# Patient Record
Sex: FEMALE | Race: BLACK OR AFRICAN AMERICAN | NOT HISPANIC OR LATINO | Employment: FULL TIME | URBAN - METROPOLITAN AREA
[De-identification: names, ages, dates, MRNs, and addresses within clinical notes are randomized per-mention and may not be internally consistent; named-entity substitution may affect disease eponyms.]

---

## 2023-02-05 PROBLEM — I25.10 CORONARY ARTERY CALCIFICATION SEEN ON CT SCAN: Status: ACTIVE | Noted: 2023-02-05

## 2023-02-05 PROBLEM — E78.2 MIXED HYPERLIPIDEMIA: Status: ACTIVE | Noted: 2023-02-05

## 2023-02-05 PROBLEM — H25.12 AGE-RELATED NUCLEAR CATARACT OF LEFT EYE: Status: ACTIVE | Noted: 2023-02-05

## 2023-02-05 PROBLEM — H43.811 POSTERIOR VITREOUS DETACHMENT OF RIGHT EYE: Status: ACTIVE | Noted: 2023-02-05

## 2023-02-05 PROBLEM — Z96.1 PSEUDOPHAKIA: Status: ACTIVE | Noted: 2023-02-05

## 2023-02-05 PROBLEM — R10.2 FEMALE PELVIC PAIN: Status: ACTIVE | Noted: 2023-02-05

## 2023-02-05 PROBLEM — M79.674 PAIN AROUND TOENAIL, RIGHT FOOT: Status: ACTIVE | Noted: 2023-02-05

## 2023-02-05 PROBLEM — N39.0 CHRONIC UTI: Status: ACTIVE | Noted: 2023-02-05

## 2023-02-05 PROBLEM — R00.1 SINUS BRADYCARDIA: Status: ACTIVE | Noted: 2023-02-05

## 2023-02-05 PROBLEM — R91.1 PULMONARY NODULE: Status: ACTIVE | Noted: 2023-02-05

## 2023-02-05 PROBLEM — R93.89 THICKENED ENDOMETRIUM: Status: ACTIVE | Noted: 2023-02-05

## 2023-02-05 PROBLEM — L84 PRE-ULCERATIVE CALLUSES: Status: ACTIVE | Noted: 2023-02-05

## 2023-02-05 PROBLEM — N93.9 ABNORMAL UTERINE BLEEDING (AUB): Status: ACTIVE | Noted: 2023-02-05

## 2023-02-05 PROBLEM — H53.9 CHANGES IN VISION: Status: ACTIVE | Noted: 2023-02-05

## 2023-02-05 PROBLEM — H25.13 CATARACT, NUCLEAR SCLEROTIC, BOTH EYES: Status: ACTIVE | Noted: 2023-02-05

## 2023-02-05 PROBLEM — E55.9 VITAMIN D DEFICIENCY: Status: ACTIVE | Noted: 2023-02-05

## 2023-02-05 PROBLEM — H40.051 OCULAR HYPERTENSION OF RIGHT EYE: Status: ACTIVE | Noted: 2023-02-05

## 2023-02-05 PROBLEM — R06.02 SOB (SHORTNESS OF BREATH): Status: ACTIVE | Noted: 2023-02-05

## 2023-02-05 PROBLEM — F32.1 MODERATE SINGLE CURRENT EPISODE OF MAJOR DEPRESSIVE DISORDER (MULTI): Status: ACTIVE | Noted: 2023-02-05

## 2023-02-05 PROBLEM — L60.2 ONYCHOGRYPHOSIS: Status: ACTIVE | Noted: 2023-02-05

## 2023-02-05 PROBLEM — R39.89 SENSATION OF PRESSURE IN BLADDER AREA: Status: ACTIVE | Noted: 2023-02-05

## 2023-02-05 PROBLEM — N81.89 PELVIC FLOOR WEAKNESS: Status: ACTIVE | Noted: 2023-02-05

## 2023-02-05 PROBLEM — R39.9 UTI SYMPTOMS: Status: ACTIVE | Noted: 2023-02-05

## 2023-02-05 PROBLEM — H40.003 GLAUCOMA SUSPECT OF BOTH EYES: Status: ACTIVE | Noted: 2023-02-05

## 2023-02-05 RX ORDER — DORZOLAMIDE HYDROCHLORIDE AND TIMOLOL MALEATE 20; 5 MG/ML; MG/ML
1 SOLUTION/ DROPS OPHTHALMIC 2 TIMES DAILY
COMMUNITY
Start: 2021-05-18 | End: 2023-03-09 | Stop reason: WASHOUT

## 2023-03-09 ENCOUNTER — OFFICE VISIT (OUTPATIENT)
Dept: PRIMARY CARE | Facility: CLINIC | Age: 68
End: 2023-03-09
Payer: MEDICARE

## 2023-03-09 VITALS
BODY MASS INDEX: 35.47 KG/M2 | HEIGHT: 67 IN | DIASTOLIC BLOOD PRESSURE: 61 MMHG | OXYGEN SATURATION: 93 % | HEART RATE: 49 BPM | SYSTOLIC BLOOD PRESSURE: 134 MMHG | WEIGHT: 226 LBS

## 2023-03-09 DIAGNOSIS — N95.8 OTHER SPECIFIED MENOPAUSAL AND PERIMENOPAUSAL DISORDERS: ICD-10-CM

## 2023-03-09 DIAGNOSIS — Z12.31 VISIT FOR SCREENING MAMMOGRAM: ICD-10-CM

## 2023-03-09 DIAGNOSIS — E78.2 MIXED HYPERLIPIDEMIA: Primary | ICD-10-CM

## 2023-03-09 DIAGNOSIS — Z13.6 SCREENING FOR CARDIOVASCULAR CONDITION: ICD-10-CM

## 2023-03-09 DIAGNOSIS — Z00.00 ENCOUNTER FOR INITIAL ANNUAL WELLNESS VISIT (AWV) IN MEDICARE PATIENT: ICD-10-CM

## 2023-03-09 DIAGNOSIS — R93.1 AGATSTON CAC SCORE 100-199: ICD-10-CM

## 2023-03-09 DIAGNOSIS — E55.9 VITAMIN D DEFICIENCY: ICD-10-CM

## 2023-03-09 DIAGNOSIS — Z00.00 ROUTINE GENERAL MEDICAL EXAMINATION AT HEALTH CARE FACILITY: ICD-10-CM

## 2023-03-09 PROBLEM — R06.02 SOB (SHORTNESS OF BREATH): Status: RESOLVED | Noted: 2023-02-05 | Resolved: 2023-03-09

## 2023-03-09 PROBLEM — R10.2 FEMALE PELVIC PAIN: Status: RESOLVED | Noted: 2023-02-05 | Resolved: 2023-03-09

## 2023-03-09 PROBLEM — R39.9 UTI SYMPTOMS: Status: RESOLVED | Noted: 2023-02-05 | Resolved: 2023-03-09

## 2023-03-09 PROCEDURE — 1036F TOBACCO NON-USER: CPT | Performed by: FAMILY MEDICINE

## 2023-03-09 PROCEDURE — 99213 OFFICE O/P EST LOW 20 MIN: CPT | Performed by: FAMILY MEDICINE

## 2023-03-09 PROCEDURE — 1159F MED LIST DOCD IN RCRD: CPT | Performed by: FAMILY MEDICINE

## 2023-03-09 PROCEDURE — G0403 EKG FOR INITIAL PREVENT EXAM: HCPCS | Performed by: FAMILY MEDICINE

## 2023-03-09 PROCEDURE — 1170F FXNL STATUS ASSESSED: CPT | Performed by: FAMILY MEDICINE

## 2023-03-09 PROCEDURE — G0402 INITIAL PREVENTIVE EXAM: HCPCS | Performed by: FAMILY MEDICINE

## 2023-03-09 PROCEDURE — 1160F RVW MEDS BY RX/DR IN RCRD: CPT | Performed by: FAMILY MEDICINE

## 2023-03-09 PROCEDURE — 1157F ADVNC CARE PLAN IN RCRD: CPT | Performed by: FAMILY MEDICINE

## 2023-03-09 RX ORDER — ATORVASTATIN CALCIUM 20 MG/1
20 TABLET, FILM COATED ORAL DAILY
Qty: 30 TABLET | Refills: 5 | Status: SHIPPED | OUTPATIENT
Start: 2023-03-09 | End: 2023-06-12 | Stop reason: SDUPTHER

## 2023-03-09 ASSESSMENT — ACTIVITIES OF DAILY LIVING (ADL)
TOILETING: INDEPENDENT
WALKS IN HOME: INDEPENDENT
HEARING - RIGHT EAR: FUNCTIONAL
GROOMING: INDEPENDENT
FEEDING YOURSELF: INDEPENDENT
ADEQUATE_TO_COMPLETE_ADL: YES
DRESSING YOURSELF: INDEPENDENT
HEARING - LEFT EAR: FUNCTIONAL
PATIENT'S MEMORY ADEQUATE TO SAFELY COMPLETE DAILY ACTIVITIES?: YES
JUDGMENT_ADEQUATE_SAFELY_COMPLETE_DAILY_ACTIVITIES: YES
BATHING: INDEPENDENT

## 2023-03-09 ASSESSMENT — ENCOUNTER SYMPTOMS
LOSS OF SENSATION IN FEET: 0
DEPRESSION: 0
OCCASIONAL FEELINGS OF UNSTEADINESS: 0

## 2023-03-09 ASSESSMENT — PATIENT HEALTH QUESTIONNAIRE - PHQ9
SUM OF ALL RESPONSES TO PHQ9 QUESTIONS 1 AND 2: 0
1. LITTLE INTEREST OR PLEASURE IN DOING THINGS: NOT AT ALL
1. LITTLE INTEREST OR PLEASURE IN DOING THINGS: NOT AT ALL
SUM OF ALL RESPONSES TO PHQ9 QUESTIONS 1 AND 2: 0
2. FEELING DOWN, DEPRESSED OR HOPELESS: NOT AT ALL
2. FEELING DOWN, DEPRESSED OR HOPELESS: NOT AT ALL

## 2023-03-09 ASSESSMENT — COLUMBIA-SUICIDE SEVERITY RATING SCALE - C-SSRS
1. IN THE PAST MONTH, HAVE YOU WISHED YOU WERE DEAD OR WISHED YOU COULD GO TO SLEEP AND NOT WAKE UP?: NO
2. HAVE YOU ACTUALLY HAD ANY THOUGHTS OF KILLING YOURSELF?: NO
6. HAVE YOU EVER DONE ANYTHING, STARTED TO DO ANYTHING, OR PREPARED TO DO ANYTHING TO END YOUR LIFE?: NO

## 2023-03-09 NOTE — PROGRESS NOTES
"Subjective   Patient ID: Zahira Morris is a 67 y.o. female who presents for Medicare Annual Wellness Visit Subsequent.    Recent Hospitalizations: No   Have you felt Anxious depressed or sad in the last 4 weeks: No   Do you have any pain right now: No   Do you have someone to help you when you need help: Some  Can you get to places out of walking distance without help: Yes  Can you do your own grocery shopping without help: Yes  Can you prepare meals without help: Yes  Can you do your housework without help: Yes  Can you manage your money without help: Yes    How do you rate your overall health: Excellent     Any trouble driving: No     How much Alcohol do you drink on an average in a week: None     Exercise: 5 times a week  Diet: Healthy  Fluid intake: Yes  Caffeine: No     Hearing concerns: No  Cognitive impairment: No   Incontinence: No     Home safety Concerns: No         HPI   The patient states that she is not on any prescribed medications at this time. She has no further concerns at this time.    Review of Systems  Constitutional: No fever or chills, No Night Sweats  Eyes: No Blurry Vision or Eye sight problems  ENT: No Nasal Discharge, Hoarseness, sore throat  Cardiovascular: no chest pain, no palpitations and no syncope.   Respiratory: no cough, no shortness of breath during exertion and no shortness of breath at rest.   Gastrointestinal: no abdominal pain, no nausea and no vomiting.   : No vaginal discharge, burning with urination, no blood in urine or stools  Skin: No Skin rashes or Lesions  Neuro: No Headache, no dizziness or Numbness or tingling  Psych: No Anxiety, depression or sleeping problems  Heme: No Easy bleeding or brusing.     Objective   /61 (BP Location: Left arm, Patient Position: Sitting, BP Cuff Size: Adult)   Pulse (!) 49   Ht 1.689 m (5' 6.5\")   Wt 103 kg (226 lb)   SpO2 93%   BMI 35.93 kg/m²     Physical Exam  Patient declined Chaperone  Constitutional: Alert and in no " acute distress. Well developed, well nourished.   Head and Face: Head and face: Normal.    Eyes: Normal external exam. Pupils were equal in size, round, reactive to light (PERRL) with normal accommodation and extraocular movements intact (EOMI).   Ears, Nose, Mouth, and Throat: External inspection of ears and nose: Normal.  Hearing: Normal.  Nasal mucosa, septum, and turbinates: Normal.  Lips, teeth, and gums: Normal.  Oropharynx: Normal.   Neck: No neck mass was observed. Supple. Thyroid not enlarged and there were no palpable thyroid nodules.   Cardiovascular: Heart rate and rhythm were normal, normal S1 and S2. Pedal pulses: Normal. No peripheral edema.   Pulmonary: No respiratory distress. Clear bilateral breath sounds.   Breast: Normal Appearance, No Masses or lumps palpated  Abdomen: Soft nontender; no abdominal mass palpated. Normal bowel sounds. No organomegaly.   Musculoskeletal: No joint swelling seen, normal movements of all extremities. Range of motion: Normal.  Muscle strength/tone: Normal.    Skin: Normal skin color and pigmentation, normal skin turgor, and no rash.   Neurologic: Deep tendon reflexes were 2+ and symmetric.   Psychiatric: Judgment and insight: Intact. Mood and affect: Normal.  Lymphatic: No cervical lymphadenopathy. Palpation of lymph nodes in axillae: Normal.  Palpation of lymph nodes in groin: Normal.    Assessment/Plan   Diagnoses and all orders for this visit:  Mixed hyperlipidemia  -     atorvastatin (Lipitor) 20 mg tablet; Take 1 tablet (20 mg) by mouth once daily.  -     Lipid Panel; Future  Agatston CAC score 100-199  -     Lipid Panel; Future  -     Comprehensive Metabolic Panel; Future  -     Follow Up In [facility] - PCP; Future  Vitamin D deficiency  Encounter for initial annual wellness visit (AWV) in Medicare patient  Other specified menopausal and perimenopausal disorders  -     XR DEXA bone density; Future  Visit for screening mammogram  -     BI mammo  bilateral screening tomosynthesis; Future  Screening for cardiovascular condition  -     ECG 12 lead  Routine general medical examination at health care facility        Dear Zahira Morris     It was my pleasure to take care of you today in the office. Below are the things we discussed today:    1. 1. Immunizations: Yearly Flu shot is recommended. Patient declined         a: COVID: Up-to-Date         b: Tetanus: Patient declined         c: Shingrix: Patient declined         d: Pneumovax: Patient declined         e: Prevnar: Patient declined    2. Blood Work: Reviewed  3. Seen your dentist twice a year  4. Yearly Eye exam is recommended    5. BMI: Obese  6: Diet recommendations:   Eat Clean, Try to have as many home cooked meals as possible  Avoid processed foods which contain excess calories, sugar, and sodium.    7. Exercise recommendations:   150 minutes a week to maintain your weight     If you have to loose weight, you need a better diet and exercise plan.     8. Supplements recommended:  a - Calcium 600 mg up to twice a day to get a total of 1200 mg. Each 8 oz of milk or yogurt or 1 oz of cheese, 1 Banana, 1 serving of green Leafy vegetable has about 300 mg of Calcium, so you may subtract that amount. Calcium citrate is the only acceptable supplement to take if you take an acid suppressing medication like Prilosec; otherwise Calcium carbonate is acceptable too (It can cause Constipation).   b - Vitamin D - 2000 IU daily     9. Please get your Living will / Advance directive completed if you do not have one already. Please make sure our office has a copy of the latest one.     10. Colonoscopy: Uptodate, repeat in December 2030  11. Mammogram: Uptodate, ordered for Nov 2023  12. PAP: Not indicated   13. DEXA: Ordered  14: Skin Check: Please see Dermatology once a year for a Skin Check.     15. Hyperlipidemia: Start 20 mg atorvastatin. CT Cardiac score showed a score of 142.     16. Low Vitamin D: Advised  patient to take 2000 Ius Vitamin D daily.    Follow up in  3 months.    Follow up in one year for a Physical. Please call the office before your Physical to see if you need blood work completed prior to your physical.     Please call me if any questions arise from now until your next visit. I will call you after I am done seeing patients. A Doctor is always available by phone when the office is closed. Please feel free to call for help with any problem that you feel shouldn't wait until the office re-opens.     Scribe Attestation  By signing my name below, IAlea Scribe   attest that this documentation has been prepared under the direction and in the presence of Duyen Collins MD.

## 2023-03-09 NOTE — PROGRESS NOTES
"Subjective   Reason for Visit: Zahira Morris is an 67 y.o. female here for a Medicare Wellness visit.     Past Medical, Surgical, and Family History reviewed and updated in chart.    Reviewed all medications by prescribing practitioner or clinical pharmacist (such as prescriptions, OTCs, herbal therapies and supplements) and documented in the medical record.    HPI                        Patient Care Team:  Duyen Collins MD as PCP - General  Duyen Collins MD as PCP - [facility] PCP     Review of Systems    Objective   Vitals:  /61 (BP Location: Left arm, Patient Position: Sitting, BP Cuff Size: Adult)   Pulse (!) 49   Ht 1.689 m (5' 6.5\")   Wt 103 kg (226 lb)   SpO2 93%   BMI 35.93 kg/m²       Physical Exam    Assessment/Plan   Problem List Items Addressed This Visit        Circulatory    Agatston CAC score 100-199    Relevant Orders    Lipid Panel    Comprehensive Metabolic Panel    Follow Up In [facility] - PCP       Endocrine/Metabolic    Vitamin D deficiency       Other    Mixed hyperlipidemia - Primary    Relevant Medications    atorvastatin (Lipitor) 20 mg tablet    Other Relevant Orders    Lipid Panel   Other Visit Diagnoses     Encounter for initial annual wellness visit (AWV) in Medicare patient        Other specified menopausal and perimenopausal disorders        Relevant Orders    XR DEXA bone density    Visit for screening mammogram        Relevant Orders    BI mammo bilateral screening tomosynthesis    Screening for cardiovascular condition        Relevant Orders    ECG 12 lead    Routine general medical examination at health care facility                 "

## 2023-06-12 ENCOUNTER — OFFICE VISIT (OUTPATIENT)
Dept: PRIMARY CARE | Facility: CLINIC | Age: 68
End: 2023-06-12
Payer: MEDICARE

## 2023-06-12 VITALS
SYSTOLIC BLOOD PRESSURE: 120 MMHG | HEIGHT: 65 IN | OXYGEN SATURATION: 92 % | BODY MASS INDEX: 37.15 KG/M2 | HEART RATE: 45 BPM | DIASTOLIC BLOOD PRESSURE: 73 MMHG | WEIGHT: 223 LBS

## 2023-06-12 DIAGNOSIS — E78.2 MIXED HYPERLIPIDEMIA: Primary | ICD-10-CM

## 2023-06-12 DIAGNOSIS — F32.1 MODERATE SINGLE CURRENT EPISODE OF MAJOR DEPRESSIVE DISORDER (MULTI): ICD-10-CM

## 2023-06-12 DIAGNOSIS — E66.01 CLASS 2 SEVERE OBESITY DUE TO EXCESS CALORIES WITH SERIOUS COMORBIDITY AND BODY MASS INDEX (BMI) OF 37.0 TO 37.9 IN ADULT (MULTI): ICD-10-CM

## 2023-06-12 DIAGNOSIS — E78.2 MIXED HYPERLIPIDEMIA: ICD-10-CM

## 2023-06-12 DIAGNOSIS — R93.1 AGATSTON CAC SCORE 100-199: ICD-10-CM

## 2023-06-12 DIAGNOSIS — R35.0 URINARY FREQUENCY: ICD-10-CM

## 2023-06-12 PROBLEM — E66.812 CLASS 2 SEVERE OBESITY DUE TO EXCESS CALORIES WITH SERIOUS COMORBIDITY AND BODY MASS INDEX (BMI) OF 37.0 TO 37.9 IN ADULT: Status: ACTIVE | Noted: 2023-06-12

## 2023-06-12 LAB
ALANINE AMINOTRANSFERASE (SGPT) (U/L) IN SER/PLAS: 20 U/L (ref 7–45)
ALBUMIN (G/DL) IN SER/PLAS: 4.3 G/DL (ref 3.4–5)
ALKALINE PHOSPHATASE (U/L) IN SER/PLAS: 94 U/L (ref 33–136)
ANION GAP IN SER/PLAS: 11 MMOL/L (ref 10–20)
ASPARTATE AMINOTRANSFERASE (SGOT) (U/L) IN SER/PLAS: 26 U/L (ref 9–39)
BILIRUBIN TOTAL (MG/DL) IN SER/PLAS: 0.5 MG/DL (ref 0–1.2)
CALCIUM (MG/DL) IN SER/PLAS: 10.1 MG/DL (ref 8.6–10.6)
CARBON DIOXIDE, TOTAL (MMOL/L) IN SER/PLAS: 30 MMOL/L (ref 21–32)
CHLORIDE (MMOL/L) IN SER/PLAS: 104 MMOL/L (ref 98–107)
CHOLESTEROL (MG/DL) IN SER/PLAS: 178 MG/DL (ref 0–199)
CHOLESTEROL IN HDL (MG/DL) IN SER/PLAS: 45.5 MG/DL
CHOLESTEROL/HDL RATIO: 3.9
CREATININE (MG/DL) IN SER/PLAS: 0.98 MG/DL (ref 0.5–1.05)
GFR FEMALE: 63 ML/MIN/1.73M2
GLUCOSE (MG/DL) IN SER/PLAS: 83 MG/DL (ref 74–99)
LDL: 107 MG/DL (ref 0–99)
POTASSIUM (MMOL/L) IN SER/PLAS: 4.1 MMOL/L (ref 3.5–5.3)
PROTEIN TOTAL: 7.3 G/DL (ref 6.4–8.2)
SODIUM (MMOL/L) IN SER/PLAS: 141 MMOL/L (ref 136–145)
TRIGLYCERIDE (MG/DL) IN SER/PLAS: 126 MG/DL (ref 0–149)
UREA NITROGEN (MG/DL) IN SER/PLAS: 11 MG/DL (ref 6–23)
VLDL: 25 MG/DL (ref 0–40)

## 2023-06-12 PROCEDURE — 1159F MED LIST DOCD IN RCRD: CPT | Performed by: FAMILY MEDICINE

## 2023-06-12 PROCEDURE — 1160F RVW MEDS BY RX/DR IN RCRD: CPT | Performed by: FAMILY MEDICINE

## 2023-06-12 PROCEDURE — 3008F BODY MASS INDEX DOCD: CPT | Performed by: FAMILY MEDICINE

## 2023-06-12 PROCEDURE — 80061 LIPID PANEL: CPT

## 2023-06-12 PROCEDURE — 1157F ADVNC CARE PLAN IN RCRD: CPT | Performed by: FAMILY MEDICINE

## 2023-06-12 PROCEDURE — 1036F TOBACCO NON-USER: CPT | Performed by: FAMILY MEDICINE

## 2023-06-12 PROCEDURE — 80053 COMPREHEN METABOLIC PANEL: CPT

## 2023-06-12 PROCEDURE — 99214 OFFICE O/P EST MOD 30 MIN: CPT | Performed by: FAMILY MEDICINE

## 2023-06-12 RX ORDER — SOLIFENACIN SUCCINATE 5 MG/1
5 TABLET, FILM COATED ORAL DAILY
Qty: 30 TABLET | Refills: 1 | Status: SHIPPED | OUTPATIENT
Start: 2023-06-12 | End: 2023-08-11

## 2023-06-12 RX ORDER — ATORVASTATIN CALCIUM 20 MG/1
20 TABLET, FILM COATED ORAL DAILY
Qty: 90 TABLET | Refills: 1 | Status: SHIPPED | OUTPATIENT
Start: 2023-06-12 | End: 2024-04-04 | Stop reason: SDUPTHER

## 2023-06-12 ASSESSMENT — ENCOUNTER SYMPTOMS
LOSS OF SENSATION IN FEET: 0
DEPRESSION: 0
OCCASIONAL FEELINGS OF UNSTEADINESS: 0

## 2023-06-12 ASSESSMENT — PATIENT HEALTH QUESTIONNAIRE - PHQ9
2. FEELING DOWN, DEPRESSED OR HOPELESS: NOT AT ALL
1. LITTLE INTEREST OR PLEASURE IN DOING THINGS: NOT AT ALL
SUM OF ALL RESPONSES TO PHQ9 QUESTIONS 1 AND 2: 0

## 2023-06-12 NOTE — PROGRESS NOTES
"Subjective   Patient ID: Zahira Morris is a 67 y.o. female who presents for Follow-up.    HPI   The patient states that she did not get blood work done for her cholesterol. She is taking atorvastatin for her hyperlipidemia at this time and will get the blood work done today. She feels like her depression is stable without any medications and complains of urinary frequency. The patient noticed a mass in her right axillar however, since then she has not noticed it anymore.    Review of Systems  Constitutional: No fever or chills  Cardiovascular: no chest pain, no palpitations and no syncope.   Respiratory: no cough, no shortness of breath during exertion and no shortness of breath at rest.   Gastrointestinal: no abdominal pain, no nausea and no vomiting.  : + urinary frequency.  Neuro: No Headache, no dizziness    Objective   /73   Pulse (!) 45   Ht 1.651 m (5' 5\")   Wt 101 kg (223 lb)   SpO2 92%   BMI 37.11 kg/m²     Physical Exam  Constitutional: Alert and in no acute distress. Well developed, well nourished  Head and Face: Head and face: Normal.    Cardiovascular: Heart rate and rhythm were normal, normal S1 and S2. No peripheral edema.   Pulmonary: No respiratory distress. Clear bilateral breath sounds.  Musculoskeletal: Gait and station: Normal. Muscle strength/tone: Normal.   Skin: Normal skin color and pigmentation, normal skin turgor, and no rash.    Psychiatric: Judgment and insight: Intact. Mood and affect: Normal.    Lab Results   Component Value Date    WBC 6.8 01/31/2023    HGB 14.0 01/31/2023    HCT 45.4 01/31/2023     01/31/2023    CHOL 237 (H) 01/31/2023    TRIG 128 01/31/2023    HDL 49.3 01/31/2023    ALT 22 01/31/2023    AST 44 (H) 01/31/2023     01/31/2023    K 4.4 01/31/2023     01/31/2023    CREATININE 1.02 01/31/2023    BUN 14 01/31/2023    CO2 29 01/31/2023    TSH 1.19 01/31/2023    INR 1.5 (H) 01/18/2021       ECG 12 lead  Sinus bradycardia with t wave non " specific abnormality      Assessment/Plan   Diagnoses and all orders for this visit:  Mixed hyperlipidemia  -     atorvastatin (Lipitor) 20 mg tablet; Take 1 tablet (20 mg) by mouth once daily.  Agatston CAC score 100-199  -     Follow Up In [facility] - PCP  Moderate single current episode of major depressive disorder (CMS/HCC)  Class 2 severe obesity due to excess calories with serious comorbidity and body mass index (BMI) of 37.0 to 37.9 in adult (CMS/HCC)  Urinary frequency  -     solifenacin (VESIcare) 5 mg tablet; Take 1 tablet (5 mg) by mouth once daily. Swallow tablet whole; do not crush, chew, or split.        Dear Zahira Morris     It was my pleasure to take care of you today in the office. Below are the things we discussed today:    1. Hyperlipidemia: Continue atorvastatin.    2. Urinary frequency: Start Vesicare 5 mg. If you do not any experience any side effects we can increase the dose. Advised the patient to consume less water after 5 pm.    3. Right axillar mass: none noted today, Advised the patient that if it reoccurs please let me know.    4. Blood work: The patient will get blood work done today.    Follow up in 1 month.    Your yearly Physical is due in: March 2024  When you call the office for your yearly Physical, please ask them to inform me to order your blood work, so that you can get the fasting blood work before your appointment and we can discuss the results at your physical.      Please call me if any questions arise from now until your next visit. I will call you after I am done seeing patients. A Doctor is always available by phone when the office is closed. Please feel free to call for help with any problem that you feel shouldn't wait until the office re-opens.     Scribe Attestation  By signing my name below, IAlea Scribe   attest that this documentation has been prepared under the direction and in the presence of Duyen Collins MD.

## 2024-02-26 ENCOUNTER — APPOINTMENT (OUTPATIENT)
Dept: RADIOLOGY | Facility: HOSPITAL | Age: 69
End: 2024-02-26
Payer: COMMERCIAL

## 2024-02-26 ENCOUNTER — HOSPITAL ENCOUNTER (EMERGENCY)
Facility: HOSPITAL | Age: 69
Discharge: HOME | End: 2024-02-26
Payer: COMMERCIAL

## 2024-02-26 VITALS
RESPIRATION RATE: 16 BRPM | HEIGHT: 65 IN | TEMPERATURE: 98.3 F | DIASTOLIC BLOOD PRESSURE: 68 MMHG | OXYGEN SATURATION: 99 % | WEIGHT: 225 LBS | HEART RATE: 55 BPM | BODY MASS INDEX: 37.49 KG/M2 | SYSTOLIC BLOOD PRESSURE: 148 MMHG

## 2024-02-26 DIAGNOSIS — S80.00XA CONTUSION OF KNEE, UNSPECIFIED LATERALITY, INITIAL ENCOUNTER: Primary | ICD-10-CM

## 2024-02-26 PROCEDURE — 2500000001 HC RX 250 WO HCPCS SELF ADMINISTERED DRUGS (ALT 637 FOR MEDICARE OP): Performed by: PHYSICIAN ASSISTANT

## 2024-02-26 PROCEDURE — 73564 X-RAY EXAM KNEE 4 OR MORE: CPT | Mod: 50

## 2024-02-26 PROCEDURE — 73564 X-RAY EXAM KNEE 4 OR MORE: CPT | Mod: BILATERAL PROCEDURE | Performed by: RADIOLOGY

## 2024-02-26 PROCEDURE — 99283 EMERGENCY DEPT VISIT LOW MDM: CPT

## 2024-02-26 RX ORDER — ACETAMINOPHEN 325 MG/1
975 TABLET ORAL ONCE
Status: COMPLETED | OUTPATIENT
Start: 2024-02-26 | End: 2024-02-26

## 2024-02-26 RX ORDER — NAPROXEN 500 MG/1
500 TABLET ORAL ONCE
Status: COMPLETED | OUTPATIENT
Start: 2024-02-26 | End: 2024-02-26

## 2024-02-26 RX ORDER — TRAMADOL HYDROCHLORIDE 50 MG/1
50 TABLET ORAL EVERY 6 HOURS PRN
Qty: 12 TABLET | Refills: 0 | Status: SHIPPED | OUTPATIENT
Start: 2024-02-26 | End: 2024-02-29

## 2024-02-26 RX ADMIN — NAPROXEN 500 MG: 500 TABLET ORAL at 13:30

## 2024-02-26 RX ADMIN — ACETAMINOPHEN 975 MG: 325 TABLET ORAL at 13:30

## 2024-02-26 ASSESSMENT — COLUMBIA-SUICIDE SEVERITY RATING SCALE - C-SSRS
2. HAVE YOU ACTUALLY HAD ANY THOUGHTS OF KILLING YOURSELF?: NO
1. IN THE PAST MONTH, HAVE YOU WISHED YOU WERE DEAD OR WISHED YOU COULD GO TO SLEEP AND NOT WAKE UP?: NO
6. HAVE YOU EVER DONE ANYTHING, STARTED TO DO ANYTHING, OR PREPARED TO DO ANYTHING TO END YOUR LIFE?: NO

## 2024-02-26 ASSESSMENT — PAIN - FUNCTIONAL ASSESSMENT: PAIN_FUNCTIONAL_ASSESSMENT: 0-10

## 2024-02-26 ASSESSMENT — PAIN DESCRIPTION - LOCATION: LOCATION: KNEE

## 2024-02-26 ASSESSMENT — PAIN SCALES - GENERAL: PAINLEVEL_OUTOF10: 9

## 2024-02-26 NOTE — ED PROVIDER NOTES
HPI   Chief Complaint   Patient presents with    Fall       68-year-old female presents with bilateral knee pain.  Mechanical fall on ice 1 week ago.  She has been ambulating but pain and swelling have progressed.  Has had some relief with over-the-counter NSAIDs and Tylenol.  No other complaints of pain or injury.      History provided by:  Patient   used: No                        [place] Coma Scale Score: 15                     Patient History   Past Medical History:   Diagnosis Date    COVID-19     COVID-19 virus infection    Left lower quadrant abdominal tenderness 10/06/2020    LLQ abdominal tenderness    Lower abdominal pain, unspecified 07/16/2021    Lower abdominal pain    Other chest pain 10/01/2020    Atypical chest pain    Pain, unspecified 03/09/2020    Pain    Pelvic and perineal pain 02/03/2020    Female pelvic pain    Personal history of other benign neoplasm 10/12/2017    History of other benign neoplasm    Personal history of other infectious and parasitic diseases 10/01/2020    History of herpes zoster    Personal history of other specified conditions 10/01/2020    History of abdominal pain    Unspecified abdominal pain 03/04/2021    Right sided abdominal pain     Past Surgical History:   Procedure Laterality Date    CT GUIDED PERCUTANEOUS PERITONEAL OR RETROPERITONEAL FLUID COLLECTION DRAINAGE  1/18/2021    CT GUIDED PERCUTANEOUS PERITONEAL OR RETROPERITONEAL FLUID COLLECTION DRAINAGE 1/18/2021 [facility]    OTHER SURGICAL HISTORY  06/04/2021    Cataract surgery    OTHER SURGICAL HISTORY  03/02/2021    Salpingectomy    OTHER SURGICAL HISTORY  03/02/2021    Small bowel resection    OTHER SURGICAL HISTORY  03/02/2021    Hysteroscopy    OTHER SURGICAL HISTORY  10/06/2020    Hernia repair     Family History   Problem Relation Name Age of Onset    Hypertension Mother          Benign    Other (Mesothelioma) Father       Social History     Tobacco Use    Smoking status:  Former     Types: Cigarettes     Quit date:      Years since quittin.1    Smokeless tobacco: Never   Vaping Use    Vaping Use: Never used   Substance Use Topics    Alcohol use: Never    Drug use: Never       Physical Exam   ED Triage Vitals   Temperature Heart Rate Respirations BP   24 1242 24 1242 24 1242 24 1246   36.8 °C (98.3 °F) 55 16 148/68      Pulse Ox Temp src Heart Rate Source Patient Position   24 1242 -- -- --   99 %         BP Location FiO2 (%)     -- --             Physical Exam  Vitals and nursing note reviewed.   Constitutional:       General: She is not in acute distress.     Appearance: Normal appearance. She is obese. She is not ill-appearing, toxic-appearing or diaphoretic.   HENT:      Head: Normocephalic and atraumatic.   Cardiovascular:      Rate and Rhythm: Normal rate.   Pulmonary:      Effort: Pulmonary effort is normal.   Musculoskeletal:         General: Swelling and tenderness present. No deformity.      Cervical back: Normal range of motion.      Comments: Right knee swollen TTP anteriorly over patellar surface.  Extensor mechanism intact.  Joint stable.  No deformity.  Mild skin ecchymoses.  No open wounds.  No pain or tenderness above or below the knee.  Left knee with less swelling.  TTP anteriorly over patella surface.  Extensor mechanism intact.  Joint stable.  No deformity.  Skin intact.  No pain or tenderness above or below the knee.   Skin:     General: Skin is warm and dry.      Findings: Bruising present.   Neurological:      General: No focal deficit present.      Mental Status: She is alert and oriented to person, place, and time.   Psychiatric:         Mood and Affect: Mood normal.         Behavior: Behavior normal.         Thought Content: Thought content normal.         ED Course & MDM   ED Course as of 24 1504   Mon 2024   1453 No fracture or dislocation of the knees.  No effusion.  Degenerative changes present. [GA]       ED Course User Index  [GA] Petros Salgado PA-C         Diagnoses as of 02/26/24 1504   Contusion of knee, unspecified laterality, initial encounter     XR knee 4+ views bilateral   Final Result   Patellar enthesopathy. Mild narrowing of the medial compartments.   Otherwise, no acute fracture.   Signed by Torrey Rizzo MD         Medical Decision Making  Bilateral knee injury: Fracture dislocation contusion sprain strain internal derangement of joint  P.o. Tylenol Naprosyn  X-rays no fracture dislocation.  Degenerative changes.  No effusion.    Patient has been ambulatory for a week since the injury.  No fracture or dislocation.  Has degenerative arthritis with addition of recent contusion from fall.  Normal neurovascular exam.  No functional deficit.  Continue Tylenol NSAIDs.  Stable for discharge outpatient management.  Follow-up with orthopedics or PCP.  Prescribed tramadol 12 tablets if needed.  May also use Tylenol NSAIDs.  Advise cane to assist with ambulation.    Amount and/or Complexity of Data Reviewed  Radiology: ordered. Decision-making details documented in ED Course.     Details: As above    Risk  OTC drugs.  Prescription drug management.        Procedure  Procedures     Petros Salgado PA-C  02/26/24 150

## 2024-02-26 NOTE — ED TRIAGE NOTES
PT PRESENTS TO THE ED FOR A FALL THAT OCCURRED ABOUT A WEEK AGO. PT ENDORSES SHE WAS OUTSIDE AND LANDED ON BOTH HER KNEES. PT STATES THAT BOTH HER KNEES STILL HURT SINCE. PT STATES THAT SHE HAS TAKEN TYLENOL FOR IBUPROFEN BUT NO RELIEF. PT ENDORSES THAT BOTH KNEES HURT. PT DENIES LOC. PT DENIES HITTING HER HEAD. PT DENIES NECK, BACK, OR HEAD PAIN. PT DENIES CHEST PAIN OR SOB. PT STATES THAT SHE CAN WALK BU THERE KNEES JUST HURT.

## 2024-03-07 ENCOUNTER — OFFICE VISIT (OUTPATIENT)
Dept: ORTHOPEDIC SURGERY | Facility: HOSPITAL | Age: 69
End: 2024-03-07
Payer: COMMERCIAL

## 2024-03-07 VITALS — WEIGHT: 225 LBS | BODY MASS INDEX: 36.16 KG/M2 | HEIGHT: 66 IN

## 2024-03-07 DIAGNOSIS — S80.01XA PATELLAR CONTUSION, RIGHT, INITIAL ENCOUNTER: Primary | ICD-10-CM

## 2024-03-07 DIAGNOSIS — M75.82 ROTATOR CUFF TENDINITIS, LEFT: ICD-10-CM

## 2024-03-07 DIAGNOSIS — S80.02XA PATELLAR CONTUSION, LEFT, INITIAL ENCOUNTER: ICD-10-CM

## 2024-03-07 PROCEDURE — 3008F BODY MASS INDEX DOCD: CPT | Performed by: ORTHOPAEDIC SURGERY

## 2024-03-07 PROCEDURE — 99203 OFFICE O/P NEW LOW 30 MIN: CPT | Performed by: ORTHOPAEDIC SURGERY

## 2024-03-07 PROCEDURE — 1126F AMNT PAIN NOTED NONE PRSNT: CPT | Performed by: ORTHOPAEDIC SURGERY

## 2024-03-07 PROCEDURE — E0143 WALKER FOLDING WHEELED W/O S: HCPCS | Performed by: ORTHOPAEDIC SURGERY

## 2024-03-07 PROCEDURE — 1160F RVW MEDS BY RX/DR IN RCRD: CPT | Performed by: ORTHOPAEDIC SURGERY

## 2024-03-07 PROCEDURE — 1159F MED LIST DOCD IN RCRD: CPT | Performed by: ORTHOPAEDIC SURGERY

## 2024-03-07 PROCEDURE — 99213 OFFICE O/P EST LOW 20 MIN: CPT | Performed by: ORTHOPAEDIC SURGERY

## 2024-03-07 PROCEDURE — 1036F TOBACCO NON-USER: CPT | Performed by: ORTHOPAEDIC SURGERY

## 2024-03-07 PROCEDURE — 1157F ADVNC CARE PLAN IN RCRD: CPT | Performed by: ORTHOPAEDIC SURGERY

## 2024-03-07 ASSESSMENT — PAIN DESCRIPTION - DESCRIPTORS: DESCRIPTORS: THROBBING

## 2024-03-07 ASSESSMENT — PAIN - FUNCTIONAL ASSESSMENT: PAIN_FUNCTIONAL_ASSESSMENT: 0-10

## 2024-03-07 ASSESSMENT — PAIN SCALES - GENERAL: PAINLEVEL_OUTOF10: 7

## 2024-03-07 NOTE — PROGRESS NOTES
68-year-old is seen with bilateral knee pain.  She has been having persistent moderate throbbing pain in both knees since falling on February 24, 2024.  She also has a history of left shoulder pain which was bothering her before this and she has had physical therapy in the past with relief.  She is a retired federal worker.  Right knee has medial and anterior pain and left knee mostly medial.    Pleasant in no acute distress.  Walks an antalgic gait.  Bilateral knee range of motion is 3 to 120 degrees.  The extensor mechanism is intact bilaterally and she can straight leg raise without a lag.  There is tenderness along the medial side of both knees.  No particular lateral tenderness.  The knees are stable to varus and valgus stress Lachman and posterior drawer.    Multiple x-ray views of the right knee and multiple x-ray views of the left knee are personally reviewed and there is no acute bony abnormality.  There are mild degenerative changes.    A discussion about the knee contusions was done.  She will perform physical therapy.  She will use a walker.  She will also perform physical therapy again for her shoulder.  If she has persistent symptoms over the next several weeks MRI could be obtained to assess for an occult medial femoral condyle fracture.  She can use Tylenol as needed.  If

## 2024-03-08 ENCOUNTER — LAB (OUTPATIENT)
Dept: LAB | Facility: LAB | Age: 69
End: 2024-03-08
Payer: COMMERCIAL

## 2024-03-08 DIAGNOSIS — R93.1 AGATSTON CAC SCORE 100-199: ICD-10-CM

## 2024-03-08 DIAGNOSIS — E78.2 MIXED HYPERLIPIDEMIA: ICD-10-CM

## 2024-03-08 LAB
ALBUMIN SERPL BCP-MCNC: 3.8 G/DL (ref 3.4–5)
ALP SERPL-CCNC: 86 U/L (ref 33–136)
ALT SERPL W P-5'-P-CCNC: 14 U/L (ref 7–45)
ANION GAP SERPL CALC-SCNC: 10 MMOL/L (ref 10–20)
AST SERPL W P-5'-P-CCNC: 17 U/L (ref 9–39)
BILIRUB SERPL-MCNC: 0.4 MG/DL (ref 0–1.2)
BUN SERPL-MCNC: 16 MG/DL (ref 6–23)
CALCIUM SERPL-MCNC: 9.1 MG/DL (ref 8.6–10.3)
CHLORIDE SERPL-SCNC: 103 MMOL/L (ref 98–107)
CHOLEST SERPL-MCNC: 235 MG/DL (ref 0–199)
CHOLESTEROL/HDL RATIO: 5.5
CO2 SERPL-SCNC: 30 MMOL/L (ref 21–32)
CREAT SERPL-MCNC: 1.02 MG/DL (ref 0.5–1.05)
EGFRCR SERPLBLD CKD-EPI 2021: 60 ML/MIN/1.73M*2
GLUCOSE SERPL-MCNC: 102 MG/DL (ref 74–99)
HDLC SERPL-MCNC: 42.9 MG/DL
LDLC SERPL CALC-MCNC: 156 MG/DL
NON HDL CHOLESTEROL: 192 MG/DL (ref 0–149)
POTASSIUM SERPL-SCNC: 4.1 MMOL/L (ref 3.5–5.3)
PROT SERPL-MCNC: 6.3 G/DL (ref 6.4–8.2)
SODIUM SERPL-SCNC: 139 MMOL/L (ref 136–145)
TRIGL SERPL-MCNC: 180 MG/DL (ref 0–149)
VLDL: 36 MG/DL (ref 0–40)

## 2024-03-08 PROCEDURE — 80053 COMPREHEN METABOLIC PANEL: CPT

## 2024-03-08 PROCEDURE — 80061 LIPID PANEL: CPT

## 2024-03-08 PROCEDURE — 36415 COLL VENOUS BLD VENIPUNCTURE: CPT

## 2024-04-03 ENCOUNTER — APPOINTMENT (OUTPATIENT)
Dept: PHYSICAL THERAPY | Facility: CLINIC | Age: 69
End: 2024-04-03

## 2024-04-03 PROBLEM — R26.2 DIFFICULTY IN WALKING INVOLVING LOWER LEG JOINT: Status: ACTIVE | Noted: 2024-04-03

## 2024-04-03 PROBLEM — M25.561 BILATERAL KNEE PAIN: Status: ACTIVE | Noted: 2024-04-03

## 2024-04-03 PROBLEM — M25.562 BILATERAL KNEE PAIN: Status: ACTIVE | Noted: 2024-04-03

## 2024-04-03 NOTE — PROGRESS NOTES
"Physical Therapy    Physical Therapy Evaluation and Treatment      Patient Name: Zahira Morris  MRN: [id number]  Today's Date: 4/4/24  Visit 1  Time Calculation  Start Time: 1045  Stop Time: 1135  Time Calculation (min): 50 min  PT Evaluation Time Entry  PT Evaluation (Low) Time Entry: 30    PT Therapeutic Procedures Time Entry  Therapeutic Exercise Time Entry: 20    PT Modalities Time Entry  Hot/Cold Pack Time Entry: 10         Assessment:    Patient presents with clinical signs and symptoms bilat  knee contusion  and patello-femoral dysfunction .  She has pain inhibition making her reluctant to move LEs. She is 6 weeks post injury. These impairments affect ADLs, work, recreational activity, exercise, transfer ability, ambulation, and sleep function that requires skilled PT intervention to resolve and enable patient to return to previous level of function. Factors that may affect progress in PT are chronic pain, obesity, medical co-morbidities, and patient compliance.  Patient response to initial treatment of   heat and exercise was tolerated well. Post treatment Zahira Morris was able to step off the treatment table with greater ease      Plan:       Current Problem:   1. Difficulty in walking involving lower leg joint  Follow Up In Physical Therapy      2. Bilateral knee pain  Follow Up In Physical Therapy      3. Patellar contusion, right, initial encounter  Referral to Physical Therapy      4. Patellar contusion, left, initial encounter  Referral to Physical Therapy      5. Rotator cuff tendinitis, left  Referral to Physical Therapy          Subjective    Zahira Morris fell on ice onto both knees 2/24/24. \"Nothing helps the pain\". She is off the walker. C/o start up stiffness     Pain:   8/10  Home Living:   Zahira Morris lives with grand daughter in 2 story home  Prior Level of Function:   independent ADLs, walk normal , exercise at local [facility]    Objective   Cognition:   A+Ox3  Observation:  "   Zahira Morris is reluctant to move LEs and has severe start up stiffness  Hip and ankle joint clearance: NT    Posture:  genu varus   knees stayed slightly flexed       knee ROM: Flexion  AROM  R 90 deg P  L 90 deg P  Extension  AROM  R 0  L 0 deg      Knee Strength:  Flex  R 3-/5   L 3- /5,  Ext  R  3-/5  L3- /5 all pain     Hip Strength:  Abduction  R  /5  L  /5           Extension  R /5    L  /5         Flexion R  /5   L  /5NA    Ligament testing:  Lachman's  R  L   medial collateral    R  L  lateral collateral   R  L    Daniel's   R  L    +NA    Accessory joint mobility: patella  hypomobile     Palpation: exquisite tenderness entire anterior medial and anterior lateral bilat knees   Gait: antalgic waddle   bilat LE no device  Special Tests:   SLR quad lag   yes     squat strategy :unable      Outcome Measures:  LEFS: 25/80    Treatments:  Modality  HP to bilat anterior knees and hips  There ex:  AAROM bilat heel slide x10 each  Quad setting 5 x each 5 sec each    EDUCATION:   extensive education regarding mechanism of injury, relevant functional anatomy, treatment program rational, self management, HEP, and POC    Advised Zahira Morris to get into pool at her [facility] and walk casually in the water, not attend the water aerobics class yet  Goals:    1. Decrease pain to 0-2/10 severity level   2. Increase   bilat knee  AROM to  0-120 deg   3. Increase  bilat knee strength to  4 /5 MMT grade  4. Improve ambulation ability to normal quality on level surface and steps at least   300 feet community distances  5. Improve out come score by 20 points  6. independent Home exercise program

## 2024-04-03 NOTE — PROGRESS NOTES
"Subjective   Patient ID: Zahira Morris is a 68 y.o. female who presents for Medicare Annual Wellness Visit Subsequent.    HPI   The patient states that she has not been taking atorvastatin consistently and blood work shows that her cholesterol is elevated at this time. She mentions that she is also taking Ozempic to aid with weight loss.    Review of Systems  Constitutional: No fever or chills, No Night Sweats  Eyes: No Blurry Vision or Eye sight problems  ENT: No Nasal Discharge, Hoarseness, sore throat  Cardiovascular: no chest pain, no palpitations and no syncope.   Respiratory: no cough, no shortness of breath during exertion and no shortness of breath at rest.   Gastrointestinal: no abdominal pain, no nausea and no vomiting.   : No vaginal discharge, burning with urination, no blood in urine or stools  Skin: No Skin rashes or Lesions  Neuro: No Headache, no dizziness or Numbness or tingling  Psych: No Anxiety, depression or sleeping problems  Heme: No Easy bleeding or brusing.     Objective   /78   Pulse 55   Ht 1.676 m (5' 6\")   Wt 104 kg (230 lb)   SpO2 94%   BMI 37.12 kg/m²     Physical Exam  Constitutional: Alert and in no acute distress. Well developed, well nourished.   Head and Face: Head and face: Normal.    Eyes: Normal external exam.   Ears, Nose, Mouth, and Throat: External inspection of ears and nose: Normal.  Hearing: Normal.    Cardiovascular: Heart rate and rhythm were normal, normal S1 and S2. Pedal pulses: Normal. No peripheral edema.   Pulmonary: No respiratory distress. Clear bilateral breath sounds.   Musculoskeletal: No joint swelling seen, normal movements of all extremities. Range of motion: Normal.  Muscle strength/tone: Normal.    Skin: Normal skin color and pigmentation, normal skin turgor, and no rash.   Psychiatric: Judgment and insight: Intact. Mood and affect: Normal.    Lab Results   Component Value Date    WBC 6.8 01/31/2023    HGB 14.0 01/31/2023    HCT 45.4 " 01/31/2023     01/31/2023    CHOL 235 (H) 03/08/2024    TRIG 180 (H) 03/08/2024    HDL 42.9 03/08/2024    ALT 14 03/08/2024    AST 17 03/08/2024     03/08/2024    K 4.1 03/08/2024     03/08/2024    CREATININE 1.02 03/08/2024    BUN 16 03/08/2024    CO2 30 03/08/2024    TSH 1.19 01/31/2023    INR 1.5 (H) 01/18/2021       XR knee 4+ views bilateral  Narrative: STUDY:  Bilateral Knee Radiographs; 2/26/24 at 1:28 PM  INDICATION:  Fell on bilateral knees.  COMPARISON:  None available.  ACCESSION NUMBER(S):  [id number]  ORDERING CLINICIAN:  RADHA MENENDEZ  TECHNIQUE:  Four view(s) of the right knee and four view(s) of the  left knee.  FINDINGS:    RIGHT KNEE:  There is no displaced fracture.  The alignment is  anatomic.  No soft tissue abnormality is seen.  There is no joint  effusion. There is mild narrowing in the medial compartment.  Enthesopathy in the superior and inferior patella.  LEFT KNEE:  There is no displaced fracture.  The alignment is  anatomic.  No soft tissue abnormality is seen.  There is no joint  effusion. There is mild narrowing in the medial compartment.  Enthesopathy in the superior and inferior patella.  Impression: Patellar enthesopathy. Mild narrowing of the medial compartments.  Otherwise, no acute fracture.  Signed by Torrey Rizzo MD      Assessment/Plan   Diagnoses and all orders for this visit:  Medicare annual wellness visit, subsequent  Class 2 severe obesity due to excess calories with serious comorbidity and body mass index (BMI) of 37.0 to 37.9 in adult (CMS/[facility])  Asymptomatic menopausal state  -     XR DEXA bone density; Future  Encounter for screening mammogram for breast cancer  -     BI mammo bilateral screening tomosynthesis; Future  Need for hepatitis C screening test  -     Hepatitis C antibody; Future  Mixed hyperlipidemia  -     CBC; Future  -     Comprehensive Metabolic Panel; Future  -     Lipid Panel; Future  -     Lipoprotein a; Future  -      atorvastatin (Lipitor) 20 mg tablet; Take 1 tablet (20 mg) by mouth once daily.  Vitamin D deficiency  -     Vitamin B12; Future  -     Vitamin D 25-Hydroxy,Total (for eval of Vitamin D levels); Future  Abnormal thyroid exam  -     TSH with reflex to Free T4 if abnormal; Future  Elevated blood sugar  -     Hemoglobin A1C; Future        Dear Zahira Morris     It was my pleasure to take care of you today in the office. Below are the things we discussed today:    1. 1. Immunizations: Yearly Flu shot is recommended. The patient declined all immunizations     2. Blood Work: Ordered   3. Seen your dentist twice a year  4. Yearly Eye exam is recommended    5. BMI: Obese   6: Diet recommendations:   Eat Clean, Try to have as many home cooked meals as possible  Avoid processed foods which contain excess calories, sugar, and sodium.    7. Exercise recommendations:   150 minutes a week to maintain your weight     If you have to loose weight, you need a better diet and exercise plan.     8. Supplements recommended:  a - Calcium 600 mg up to twice a day to get a total of 1200 mg. Each 8 oz of milk or yogurt or 1 oz of cheese, 1 Banana, 1 serving of green Leafy vegetable has about 300 mg of Calcium, so you may subtract that amount. Calcium citrate is the only acceptable supplement to take if you take an acid suppressing medication like Prilosec; otherwise Calcium carbonate is acceptable too (It can cause Constipation).   b - Vitamin D - 2000 IU daily     9. Please get your Living will / Advance directive completed if you do not have one already. Please make sure our office has a copy of the latest one.     10. Colonoscopy: Uptodate. Last done in Oct 2020. Repeat in Oct 2030   11. Mammogram: Ordered   12. PAP: Not indicated   13. DEXA: Ordered   14: Skin Check: Please see Dermatology once a year for a Skin Check.     15. Hyperlipidemia: Advised her to take atorvastatin as prescribed daily and she will recheck her numbers in 2  months.    16. Weight loss: The patient is on Ozempic.     Follow up in one year for a Physical. Please call the office before your Physical to see if you need blood work completed prior to your physical.     Please call me if any questions arise from now until your next visit. I will call you after I am done seeing patients. A Doctor is always available by phone when the office is closed. Please feel free to call for help with any problem that you feel shouldn't wait until the office re-opens.     Scribe Attestation  By signing my name below, IAlea Scribe   attest that this documentation has been prepared under the direction and in the presence of Duyen Collins MD.

## 2024-04-04 ENCOUNTER — OFFICE VISIT (OUTPATIENT)
Dept: PRIMARY CARE | Facility: CLINIC | Age: 69
End: 2024-04-04
Payer: COMMERCIAL

## 2024-04-04 ENCOUNTER — EVALUATION (OUTPATIENT)
Dept: PHYSICAL THERAPY | Facility: CLINIC | Age: 69
End: 2024-04-04
Payer: COMMERCIAL

## 2024-04-04 VITALS
OXYGEN SATURATION: 94 % | HEART RATE: 55 BPM | DIASTOLIC BLOOD PRESSURE: 78 MMHG | HEIGHT: 66 IN | WEIGHT: 230 LBS | SYSTOLIC BLOOD PRESSURE: 127 MMHG | BODY MASS INDEX: 36.96 KG/M2

## 2024-04-04 DIAGNOSIS — Z12.31 ENCOUNTER FOR SCREENING MAMMOGRAM FOR BREAST CANCER: ICD-10-CM

## 2024-04-04 DIAGNOSIS — M25.561 BILATERAL KNEE PAIN: ICD-10-CM

## 2024-04-04 DIAGNOSIS — Z11.59 NEED FOR HEPATITIS C SCREENING TEST: ICD-10-CM

## 2024-04-04 DIAGNOSIS — M25.562 BILATERAL KNEE PAIN: ICD-10-CM

## 2024-04-04 DIAGNOSIS — Z00.00 MEDICARE ANNUAL WELLNESS VISIT, SUBSEQUENT: Primary | ICD-10-CM

## 2024-04-04 DIAGNOSIS — R26.2 DIFFICULTY IN WALKING INVOLVING LOWER LEG JOINT: Primary | ICD-10-CM

## 2024-04-04 DIAGNOSIS — Z78.0 ASYMPTOMATIC MENOPAUSAL STATE: ICD-10-CM

## 2024-04-04 DIAGNOSIS — M75.82 ROTATOR CUFF TENDINITIS, LEFT: ICD-10-CM

## 2024-04-04 DIAGNOSIS — R94.6 ABNORMAL THYROID EXAM: ICD-10-CM

## 2024-04-04 DIAGNOSIS — E78.2 MIXED HYPERLIPIDEMIA: ICD-10-CM

## 2024-04-04 DIAGNOSIS — E55.9 VITAMIN D DEFICIENCY: ICD-10-CM

## 2024-04-04 DIAGNOSIS — R73.9 ELEVATED BLOOD SUGAR: ICD-10-CM

## 2024-04-04 DIAGNOSIS — S80.01XA PATELLAR CONTUSION, RIGHT, INITIAL ENCOUNTER: ICD-10-CM

## 2024-04-04 DIAGNOSIS — E66.01 CLASS 2 SEVERE OBESITY DUE TO EXCESS CALORIES WITH SERIOUS COMORBIDITY AND BODY MASS INDEX (BMI) OF 37.0 TO 37.9 IN ADULT (MULTI): ICD-10-CM

## 2024-04-04 DIAGNOSIS — S80.02XA PATELLAR CONTUSION, LEFT, INITIAL ENCOUNTER: ICD-10-CM

## 2024-04-04 PROBLEM — E66.812 CLASS 2 SEVERE OBESITY DUE TO EXCESS CALORIES WITH SERIOUS COMORBIDITY AND BODY MASS INDEX (BMI) OF 37.0 TO 37.9 IN ADULT: Status: RESOLVED | Noted: 2023-06-12 | Resolved: 2024-04-04

## 2024-04-04 PROCEDURE — G0439 PPPS, SUBSEQ VISIT: HCPCS | Performed by: FAMILY MEDICINE

## 2024-04-04 PROCEDURE — 1123F ACP DISCUSS/DSCN MKR DOCD: CPT | Performed by: FAMILY MEDICINE

## 2024-04-04 PROCEDURE — 1036F TOBACCO NON-USER: CPT | Performed by: FAMILY MEDICINE

## 2024-04-04 PROCEDURE — 97161 PT EVAL LOW COMPLEX 20 MIN: CPT | Mod: GP | Performed by: PHYSICAL THERAPIST

## 2024-04-04 PROCEDURE — 1160F RVW MEDS BY RX/DR IN RCRD: CPT | Performed by: FAMILY MEDICINE

## 2024-04-04 PROCEDURE — 99397 PER PM REEVAL EST PAT 65+ YR: CPT | Performed by: FAMILY MEDICINE

## 2024-04-04 PROCEDURE — 1159F MED LIST DOCD IN RCRD: CPT | Performed by: FAMILY MEDICINE

## 2024-04-04 PROCEDURE — 97010 HOT OR COLD PACKS THERAPY: CPT | Mod: GP | Performed by: PHYSICAL THERAPIST

## 2024-04-04 PROCEDURE — 97110 THERAPEUTIC EXERCISES: CPT | Mod: GP | Performed by: PHYSICAL THERAPIST

## 2024-04-04 PROCEDURE — 99214 OFFICE O/P EST MOD 30 MIN: CPT | Performed by: FAMILY MEDICINE

## 2024-04-04 PROCEDURE — 1170F FXNL STATUS ASSESSED: CPT | Performed by: FAMILY MEDICINE

## 2024-04-04 PROCEDURE — 1157F ADVNC CARE PLAN IN RCRD: CPT | Performed by: FAMILY MEDICINE

## 2024-04-04 PROCEDURE — 3008F BODY MASS INDEX DOCD: CPT | Performed by: FAMILY MEDICINE

## 2024-04-04 RX ORDER — ATORVASTATIN CALCIUM 20 MG/1
20 TABLET, FILM COATED ORAL DAILY
Qty: 90 TABLET | Refills: 3 | Status: SHIPPED | OUTPATIENT
Start: 2024-04-04

## 2024-04-04 ASSESSMENT — ENCOUNTER SYMPTOMS
DEPRESSION: 0
OCCASIONAL FEELINGS OF UNSTEADINESS: 1
LOSS OF SENSATION IN FEET: 0

## 2024-04-04 ASSESSMENT — COLUMBIA-SUICIDE SEVERITY RATING SCALE - C-SSRS
2. HAVE YOU ACTUALLY HAD ANY THOUGHTS OF KILLING YOURSELF?: NO
1. IN THE PAST MONTH, HAVE YOU WISHED YOU WERE DEAD OR WISHED YOU COULD GO TO SLEEP AND NOT WAKE UP?: NO

## 2024-04-04 ASSESSMENT — ACTIVITIES OF DAILY LIVING (ADL)
GROCERY_SHOPPING: INDEPENDENT
MANAGING_FINANCES: INDEPENDENT
DOING_HOUSEWORK: INDEPENDENT
TAKING_MEDICATION: INDEPENDENT
BATHING: INDEPENDENT
DRESSING: INDEPENDENT

## 2024-04-04 ASSESSMENT — PATIENT HEALTH QUESTIONNAIRE - PHQ9
1. LITTLE INTEREST OR PLEASURE IN DOING THINGS: NOT AT ALL
SUM OF ALL RESPONSES TO PHQ9 QUESTIONS 1 AND 2: 0
2. FEELING DOWN, DEPRESSED OR HOPELESS: NOT AT ALL

## 2024-04-29 ENCOUNTER — TREATMENT (OUTPATIENT)
Dept: PHYSICAL THERAPY | Facility: CLINIC | Age: 69
End: 2024-04-29
Payer: COMMERCIAL

## 2024-04-29 DIAGNOSIS — R26.2 DIFFICULTY IN WALKING INVOLVING LOWER LEG JOINT: Primary | ICD-10-CM

## 2024-04-29 DIAGNOSIS — M25.561 BILATERAL KNEE PAIN: ICD-10-CM

## 2024-04-29 DIAGNOSIS — M25.562 BILATERAL KNEE PAIN: ICD-10-CM

## 2024-04-29 PROCEDURE — 97110 THERAPEUTIC EXERCISES: CPT | Mod: GP,CQ | Performed by: PHYSICAL THERAPY ASSISTANT

## 2024-04-29 NOTE — PROGRESS NOTES
Physical Therapy    Physical Therapy Treatment    Patient Name: Zahira Morris  MRN: [id number]  Today's Date: 4/29/2024  Time Calculation  Start Time: 1520  Stop Time: 1600  Time Calculation (min): 40 min  PT Therapeutic Procedures Time Entry  Therapeutic Exercise Time Entry: 40         Insurance : [facility] Commercial Insurance   Authorization /Certification / Visits allowed:    Visit 2      Assessment:  Especially challenged w/ psoas exercises. Box added during bridges to increase hams and quad recruitment.   Plan:  Add hip add/abduction exercises.     Current Problem  1. Difficulty in walking involving lower leg joint        2. Bilateral knee pain            Subjective    Goes to Water exercises 2x/week.   Pain  6/10 VAS     Objective   L psoas unable to lift against gravity seated     Treatments:  Therapeutic Exercise:   HP to bilat anterior knees and hips  Quad setting 5 x each 5 sec each  Seated heel slides x 20 reps R/L  Hamstring sets 5 sec hold  2 x 10 R/L  Attempted seated marches ; unable on L   Supine psoas march in hooklying 5 reps x 3 sets ; R, assist on L  All Body Bridges w/ feet on box 5 reps x 3 sets     OP EDUCATION:       Goals:

## 2024-04-30 ENCOUNTER — HOSPITAL ENCOUNTER (OUTPATIENT)
Dept: RADIOLOGY | Facility: CLINIC | Age: 69
Discharge: HOME | End: 2024-04-30
Payer: COMMERCIAL

## 2024-04-30 VITALS — WEIGHT: 229.28 LBS | HEIGHT: 66 IN | BODY MASS INDEX: 36.85 KG/M2

## 2024-04-30 DIAGNOSIS — Z12.31 ENCOUNTER FOR SCREENING MAMMOGRAM FOR BREAST CANCER: ICD-10-CM

## 2024-04-30 PROCEDURE — 77067 SCR MAMMO BI INCL CAD: CPT | Performed by: RADIOLOGY

## 2024-04-30 PROCEDURE — 77063 BREAST TOMOSYNTHESIS BI: CPT | Performed by: RADIOLOGY

## 2024-04-30 PROCEDURE — 77067 SCR MAMMO BI INCL CAD: CPT

## 2024-05-06 ENCOUNTER — TREATMENT (OUTPATIENT)
Dept: PHYSICAL THERAPY | Facility: CLINIC | Age: 69
End: 2024-05-06
Payer: COMMERCIAL

## 2024-05-06 DIAGNOSIS — M25.561 BILATERAL KNEE PAIN: ICD-10-CM

## 2024-05-06 DIAGNOSIS — M25.562 BILATERAL KNEE PAIN: ICD-10-CM

## 2024-05-06 DIAGNOSIS — R26.2 DIFFICULTY IN WALKING INVOLVING LOWER LEG JOINT: ICD-10-CM

## 2024-05-06 PROCEDURE — 97110 THERAPEUTIC EXERCISES: CPT | Mod: GP,CQ | Performed by: PHYSICAL THERAPY ASSISTANT

## 2024-05-06 NOTE — PROGRESS NOTES
Physical Therapy    Physical Therapy Treatment    Patient Name: Zahira Morris  MRN: [id number]  Today's Date: 5/6/2024  Time Calculation  Start Time: 1440  Stop Time: 1530  Time Calculation (min): 50 min  PT Therapeutic Procedures Time Entry  Therapeutic Exercise Time Entry: 50         Insurance : [facility] Commercial Insurance   Authorization /Certification / Visits allowed:    Visit 3      Assessment:  Improved LE strength noted since she was able to progress to straight leg psoas marches   Plan:  Cable column walking     Current Problem  1. Difficulty in walking involving lower leg joint  Follow Up In Physical Therapy      2. Bilateral knee pain  Follow Up In Physical Therapy            Subjective    Compliant w/ HEP, wants to get back to exercising in a gym.   Pain  4/10 VAS     Objective   Anterior pelvic tilt     Treatments:  Therapeutic Exercise:   Sit / stand x 5 reps w/ UE A to sit   Quad setting 10 x each 5 sec each  Seated heel slides x 20 reps R/L  Hamstring sets 5 sec hold  2 x 10 R/L  Supine psoas march in hooklying 5 reps  R/L--> leg straight x 10 reps R/L   All Body Bridges w/ feet on box 5 reps x 4 sets   LAQ x 10 reps w/ 5 sec hold   Standing core recruitment standing #4-->#8 x 1 min each   Shuttle 1B x 30 reps       OP EDUCATION:       Goals:

## 2024-05-20 ENCOUNTER — TREATMENT (OUTPATIENT)
Dept: PHYSICAL THERAPY | Facility: CLINIC | Age: 69
End: 2024-05-20
Payer: COMMERCIAL

## 2024-05-20 ENCOUNTER — LAB (OUTPATIENT)
Dept: LAB | Facility: LAB | Age: 69
End: 2024-05-20
Payer: COMMERCIAL

## 2024-05-20 DIAGNOSIS — E78.2 MIXED HYPERLIPIDEMIA: ICD-10-CM

## 2024-05-20 DIAGNOSIS — Z11.59 NEED FOR HEPATITIS C SCREENING TEST: ICD-10-CM

## 2024-05-20 DIAGNOSIS — R94.6 ABNORMAL THYROID EXAM: ICD-10-CM

## 2024-05-20 DIAGNOSIS — S80.01XA PATELLAR CONTUSION, RIGHT, INITIAL ENCOUNTER: ICD-10-CM

## 2024-05-20 DIAGNOSIS — M25.562 BILATERAL KNEE PAIN: ICD-10-CM

## 2024-05-20 DIAGNOSIS — R26.2 DIFFICULTY IN WALKING INVOLVING LOWER LEG JOINT: Primary | ICD-10-CM

## 2024-05-20 DIAGNOSIS — M25.561 BILATERAL KNEE PAIN: ICD-10-CM

## 2024-05-20 DIAGNOSIS — E55.9 VITAMIN D DEFICIENCY: ICD-10-CM

## 2024-05-20 DIAGNOSIS — S80.02XA PATELLAR CONTUSION, LEFT, INITIAL ENCOUNTER: ICD-10-CM

## 2024-05-20 DIAGNOSIS — R73.9 ELEVATED BLOOD SUGAR: ICD-10-CM

## 2024-05-20 LAB
25(OH)D3 SERPL-MCNC: 15 NG/ML (ref 30–100)
ALBUMIN SERPL BCP-MCNC: 4.1 G/DL (ref 3.4–5)
ALP SERPL-CCNC: 80 U/L (ref 33–136)
ALT SERPL W P-5'-P-CCNC: 13 U/L (ref 7–45)
ANION GAP SERPL CALC-SCNC: 11 MMOL/L (ref 10–20)
AST SERPL W P-5'-P-CCNC: 21 U/L (ref 9–39)
BILIRUB SERPL-MCNC: 0.4 MG/DL (ref 0–1.2)
BUN SERPL-MCNC: 8 MG/DL (ref 6–23)
CALCIUM SERPL-MCNC: 9.4 MG/DL (ref 8.6–10.6)
CHLORIDE SERPL-SCNC: 104 MMOL/L (ref 98–107)
CHOLEST SERPL-MCNC: 212 MG/DL (ref 0–199)
CHOLESTEROL/HDL RATIO: 5
CO2 SERPL-SCNC: 28 MMOL/L (ref 21–32)
CREAT SERPL-MCNC: 1.1 MG/DL (ref 0.5–1.05)
EGFRCR SERPLBLD CKD-EPI 2021: 55 ML/MIN/1.73M*2
ERYTHROCYTE [DISTWIDTH] IN BLOOD BY AUTOMATED COUNT: 14.1 % (ref 11.5–14.5)
EST. AVERAGE GLUCOSE BLD GHB EST-MCNC: 117 MG/DL
GLUCOSE SERPL-MCNC: 90 MG/DL (ref 74–99)
HBA1C MFR BLD: 5.7 %
HCT VFR BLD AUTO: 45.5 % (ref 36–46)
HCV AB SER QL: NONREACTIVE
HDLC SERPL-MCNC: 42 MG/DL
HGB BLD-MCNC: 13.3 G/DL (ref 12–16)
LDLC SERPL CALC-MCNC: 139 MG/DL
MCH RBC QN AUTO: 25 PG (ref 26–34)
MCHC RBC AUTO-ENTMCNC: 29.2 G/DL (ref 32–36)
MCV RBC AUTO: 86 FL (ref 80–100)
NON HDL CHOLESTEROL: 170 MG/DL (ref 0–149)
NRBC BLD-RTO: 0 /100 WBCS (ref 0–0)
PLATELET # BLD AUTO: 328 X10*3/UL (ref 150–450)
POTASSIUM SERPL-SCNC: 4.3 MMOL/L (ref 3.5–5.3)
PROT SERPL-MCNC: 6.8 G/DL (ref 6.4–8.2)
RBC # BLD AUTO: 5.31 X10*6/UL (ref 4–5.2)
SODIUM SERPL-SCNC: 139 MMOL/L (ref 136–145)
TRIGL SERPL-MCNC: 154 MG/DL (ref 0–149)
TSH SERPL-ACNC: 1.67 MIU/L (ref 0.44–3.98)
VIT B12 SERPL-MCNC: 336 PG/ML (ref 211–911)
VLDL: 31 MG/DL (ref 0–40)
WBC # BLD AUTO: 6.4 X10*3/UL (ref 4.4–11.3)

## 2024-05-20 PROCEDURE — 82306 VITAMIN D 25 HYDROXY: CPT

## 2024-05-20 PROCEDURE — 36415 COLL VENOUS BLD VENIPUNCTURE: CPT

## 2024-05-20 PROCEDURE — 86803 HEPATITIS C AB TEST: CPT

## 2024-05-20 PROCEDURE — 84443 ASSAY THYROID STIM HORMONE: CPT

## 2024-05-20 PROCEDURE — 80061 LIPID PANEL: CPT

## 2024-05-20 PROCEDURE — 82607 VITAMIN B-12: CPT

## 2024-05-20 PROCEDURE — 80053 COMPREHEN METABOLIC PANEL: CPT

## 2024-05-20 PROCEDURE — 83036 HEMOGLOBIN GLYCOSYLATED A1C: CPT

## 2024-05-20 PROCEDURE — 97110 THERAPEUTIC EXERCISES: CPT | Mod: GP | Performed by: PHYSICAL THERAPIST

## 2024-05-20 PROCEDURE — 82172 ASSAY OF APOLIPOPROTEIN: CPT

## 2024-05-20 PROCEDURE — 85027 COMPLETE CBC AUTOMATED: CPT

## 2024-05-20 PROCEDURE — 97535 SELF CARE MNGMENT TRAINING: CPT | Mod: GP | Performed by: PHYSICAL THERAPIST

## 2024-05-20 NOTE — PROGRESS NOTES
Physical Therapy    Physical Therapy Treatment    Patient Name: Zahira Morris  MRN: [id number]  Today's Date: 2024  Time Calculation  Start Time: 1445  Stop Time: 1515  Time Calculation (min): 30 min       PT Therapeutic Procedures Time Entry  Therapeutic Exercise Time Entry: 15  Self-Care/Home Mgmt Training: 15           Insurance : [facility] Commercial Insurance   Authorization /Certification / Visits allowed:    Visit 4      Assessment:  Zahira Morris continues to present with persistent bilateral anterior knee pain. Her knee joint AROM is still severely limited and continues to have night pain . Her gait pattern is antalgic R and L and the only measurable clinical improvement is L knee flexion now beats 100 degs. I advised her to follow up with Dr Palafox . We will discharge PT as it has aggravated her anterior knee pain.  Plan:  Physical Therapy    Discharge Summary    Name: Zahira Morris  MRN: [id number]  : 1955  Date: 24    Discharge Summary: PT    Discharge Information: Date of discharge 24, Date of last visit 24, and Number of attended visits 4    Therapy Summary: see objective and assessment    Discharge Status: see objective and assessment     Rehab Discharge Reason: Progress plateaued; further improvement possible     Current Problem  1. Difficulty in walking involving lower leg joint  Follow Up In Physical Therapy      2. Patellar contusion, right, initial encounter        3. Patellar contusion, left, initial encounter        4. Bilateral knee pain  Follow Up In Physical Therapy            Subjective    Zahira Morris reports that she is no better since starting PT. Exercise makes her worse R knee pain > L knee. She has severe night pain that keeps her awake  Pain  5/10     Objective   Bilat AROM anti-gravity knee extension anterior medial knee pain   Supine knee flexion  R 92 deg  115 deg both Pain  Gait antalgic lateral sway bilat   Palpation :exquisite tenderness to  bilat anterior medial knee  LEFS 27/80 vs 25/80 at initial eval on 4/4/24      Treatments:    Therapeutic Exercise:   Quad setting 10 x each 5 sec each  Seated heel slides x 20 reps R/L  Not done today  Sit / stand x 5 reps w/ UE A to sit   Hamstring sets 5 sec hold  2 x 10 R/L  Supine psoas march in hooklying 5 reps  R/L--> leg straight x 10 reps R/L   All Body Bridges w/ feet on box 5 reps x 4 sets   LAQ x 10 reps w/ 5 sec hold   Standing core recruitment standing #4-->#8 x 1 min each   Shuttle 1B x 30 reps       OP EDUCATION:   Advised Zahira Morris to follow up with orthopedic surgeon as she has had no significant improvement over 6 weeks of skilled PT.  We discussed that she needs to continue to at least perform isometric quad sets and heel slide to maintain knee joint function.     Goals:    1. Decrease pain to 0-2/10 severity level NA  2. Increase   bilat knee  AROM to  0-120 deg NA  3. Increase  bilat knee strength to  4 /5 MMT gradeNA  4. Improve ambulation ability to normal quality on level surface and steps at least   300 feet community distances NA  5. Improve out come score by 20 pointsNA  6. independent Home exercise program PA

## 2024-05-22 LAB — LPA SERPL-MCNC: 99 MG/DL

## 2024-06-20 ENCOUNTER — OFFICE VISIT (OUTPATIENT)
Dept: ORTHOPEDIC SURGERY | Facility: HOSPITAL | Age: 69
End: 2024-06-20
Payer: COMMERCIAL

## 2024-06-20 ENCOUNTER — HOSPITAL ENCOUNTER (OUTPATIENT)
Dept: RADIOLOGY | Facility: CLINIC | Age: 69
Discharge: HOME | End: 2024-06-20
Payer: COMMERCIAL

## 2024-06-20 VITALS — BODY MASS INDEX: 34.21 KG/M2 | HEIGHT: 67 IN | WEIGHT: 218 LBS

## 2024-06-20 DIAGNOSIS — G89.29 BILATERAL CHRONIC KNEE PAIN: Primary | ICD-10-CM

## 2024-06-20 DIAGNOSIS — M75.82 ROTATOR CUFF TENDONITIS, LEFT: ICD-10-CM

## 2024-06-20 DIAGNOSIS — Z78.0 ASYMPTOMATIC MENOPAUSAL STATE: ICD-10-CM

## 2024-06-20 DIAGNOSIS — S83.242D ACUTE MEDIAL MENISCAL TEAR, LEFT, SUBSEQUENT ENCOUNTER: ICD-10-CM

## 2024-06-20 DIAGNOSIS — M25.561 BILATERAL CHRONIC KNEE PAIN: Primary | ICD-10-CM

## 2024-06-20 DIAGNOSIS — M25.562 BILATERAL CHRONIC KNEE PAIN: Primary | ICD-10-CM

## 2024-06-20 DIAGNOSIS — S83.241D ACUTE MEDIAL MENISCUS TEAR OF RIGHT KNEE, SUBSEQUENT ENCOUNTER: ICD-10-CM

## 2024-06-20 PROCEDURE — 77080 DXA BONE DENSITY AXIAL: CPT

## 2024-06-20 PROCEDURE — 1123F ACP DISCUSS/DSCN MKR DOCD: CPT | Performed by: ORTHOPAEDIC SURGERY

## 2024-06-20 PROCEDURE — 1159F MED LIST DOCD IN RCRD: CPT | Performed by: ORTHOPAEDIC SURGERY

## 2024-06-20 PROCEDURE — 1160F RVW MEDS BY RX/DR IN RCRD: CPT | Performed by: ORTHOPAEDIC SURGERY

## 2024-06-20 PROCEDURE — 1157F ADVNC CARE PLAN IN RCRD: CPT | Performed by: ORTHOPAEDIC SURGERY

## 2024-06-20 PROCEDURE — 99213 OFFICE O/P EST LOW 20 MIN: CPT | Performed by: ORTHOPAEDIC SURGERY

## 2024-06-20 PROCEDURE — 3008F BODY MASS INDEX DOCD: CPT | Performed by: ORTHOPAEDIC SURGERY

## 2024-06-20 PROCEDURE — 1036F TOBACCO NON-USER: CPT | Performed by: ORTHOPAEDIC SURGERY

## 2024-06-20 PROCEDURE — 1125F AMNT PAIN NOTED PAIN PRSNT: CPT | Performed by: ORTHOPAEDIC SURGERY

## 2024-06-20 ASSESSMENT — PAIN DESCRIPTION - DESCRIPTORS: DESCRIPTORS: THROBBING;SHARP

## 2024-06-20 ASSESSMENT — PAIN SCALES - GENERAL: PAINLEVEL_OUTOF10: 8

## 2024-06-20 ASSESSMENT — PAIN - FUNCTIONAL ASSESSMENT: PAIN_FUNCTIONAL_ASSESSMENT: 0-10

## 2024-06-21 NOTE — PROGRESS NOTES
68-year-old is seen for both knees and left shoulder.  She has been having a persistent moderate throbbing pain along the medial side of the right and the left knee.  She has had conservative treatment but has persistent symptoms.  Pain is worse going up and down stairs and getting up and down from a chair in and out of a car.  She fell onto both knees February 24, 2024.  She has also had longstanding shoulder pain in the left shoulder and she has had physical therapy for this.  She is a retired federal worker.    Pleasant and in no acute distress. Ambulates with a mildly antalgic gait.  Right knee range of motion is 3-120. There is a mild effusion and no instability. The knee is stable to varus and valgus stress Lachman and posterior drawer. There is medial joint line tenderness Roldan's is positive with pain medially.  The Left knee range of motion is 3 to 120 degrees.  There is a mild effusion.  No instability.  Roldan's is positive with pain medially.  There is medial joint line tenderness.  Bilateral lower extremities are well-perfused the skin is intact and muscle tone is adequate.  There is adequate range of motion of his hips without significant pain. Left shoulder forward flexion 160°. No effusion or instability. There is positive Neer and García impingement. There is subacromial crepitus and tenderness around the subacromial space.  There is biceps tenderness. There is a positive Lares's test. No acromioclavicular tenderness. Rotator cuff strength is decreased and there is discomfort with strength testing. Right shoulder forward flexion 180°. No effusion or instability. No impingement or crepitus or tenderness involving the subacromial space. No biceps or acromioclavicular tenderness. There is intact rotator cuff strength. There is adequate range of motion of the cervical spine without pain. Both upper extremities are well perfused skin is intact and muscle tone is adequate. Elbow flexion and  extension and wrist flexion and extension strength are intact.    A discussion about her shoulder was done.  She will perform physical therapy for the left shoulder.  In regard to the knees she will be sent for MRI to evaluate for potential meniscus tear or occult medial femoral condyle fracture.  She can use Tylenol and avoid aggravating activities.  Further recommendations after the MRI.

## 2024-07-08 ENCOUNTER — HOSPITAL ENCOUNTER (OUTPATIENT)
Dept: RADIOLOGY | Facility: HOSPITAL | Age: 69
Discharge: HOME | End: 2024-07-08
Payer: COMMERCIAL

## 2024-07-08 DIAGNOSIS — S83.242D ACUTE MEDIAL MENISCAL TEAR, LEFT, SUBSEQUENT ENCOUNTER: ICD-10-CM

## 2024-07-08 DIAGNOSIS — S83.241D ACUTE MEDIAL MENISCUS TEAR OF RIGHT KNEE, SUBSEQUENT ENCOUNTER: ICD-10-CM

## 2024-07-08 PROCEDURE — 73721 MRI JNT OF LWR EXTRE W/O DYE: CPT | Mod: RIGHT SIDE | Performed by: RADIOLOGY

## 2024-07-08 PROCEDURE — 73721 MRI JNT OF LWR EXTRE W/O DYE: CPT | Mod: LT

## 2024-07-08 PROCEDURE — 73721 MRI JNT OF LWR EXTRE W/O DYE: CPT | Mod: RT

## 2024-07-08 PROCEDURE — 73721 MRI JNT OF LWR EXTRE W/O DYE: CPT | Mod: LEFT SIDE | Performed by: RADIOLOGY

## 2024-07-11 ENCOUNTER — TELEPHONE (OUTPATIENT)
Dept: ORTHOPEDIC SURGERY | Facility: CLINIC | Age: 69
End: 2024-07-11
Payer: COMMERCIAL

## 2024-07-11 NOTE — TELEPHONE ENCOUNTER
Spoke with Zuly in regards to MRI results which showed arthritis and MCL sprain.  She is aware there is no indication at this time for any surgical intervention.  Patient has performed physical therapy for her knees, and states that physical therapy put a halt to her appointments and wanted her to follow-up with Dr. Palafox, which she did.  She will continue with her home exercises and will make a follow-up with Dr. Palafox or myself for cortisone injections.    ----- Message from Lane Palafox sent at 7/10/2024  1:09 PM EDT -----  Please let her know the MRI is of the right knee and the left knee both showed arthritis and MCL sprain.  She should perform physical therapy.  She may benefit from cortisone injections for her knees.  No need for knee arthroscopy.  ----- Message -----  From: Interface, Radiology Results In  Sent: 7/8/2024   6:11 PM EDT  To: Lane Palafox MD

## 2024-09-26 ENCOUNTER — OFFICE VISIT (OUTPATIENT)
Dept: URGENT CARE | Age: 69
End: 2024-09-26
Payer: COMMERCIAL

## 2024-09-26 VITALS
OXYGEN SATURATION: 96 % | WEIGHT: 213 LBS | HEART RATE: 69 BPM | SYSTOLIC BLOOD PRESSURE: 138 MMHG | TEMPERATURE: 97.5 F | BODY MASS INDEX: 33.36 KG/M2 | DIASTOLIC BLOOD PRESSURE: 79 MMHG

## 2024-09-26 DIAGNOSIS — M62.830 SPASM OF LEFT TRAPEZIUS MUSCLE: Primary | ICD-10-CM

## 2024-09-26 RX ORDER — KETOROLAC TROMETHAMINE 30 MG/ML
30 INJECTION, SOLUTION INTRAMUSCULAR; INTRAVENOUS ONCE
Status: COMPLETED | OUTPATIENT
Start: 2024-09-26 | End: 2024-09-26

## 2024-09-26 RX ORDER — KETOROLAC TROMETHAMINE 30 MG/ML
30 INJECTION, SOLUTION INTRAMUSCULAR; INTRAVENOUS ONCE
Status: DISCONTINUED | OUTPATIENT
Start: 2024-09-26 | End: 2024-09-26

## 2024-09-26 RX ORDER — BACLOFEN 10 MG/1
10 TABLET ORAL 3 TIMES DAILY
Qty: 15 TABLET | Refills: 0 | Status: SHIPPED | OUTPATIENT
Start: 2024-09-26 | End: 2024-10-01

## 2024-09-26 ASSESSMENT — PAIN DESCRIPTION - ORIENTATION: ORIENTATION: LEFT

## 2024-09-26 ASSESSMENT — ENCOUNTER SYMPTOMS: NECK PAIN: 1

## 2024-09-26 ASSESSMENT — PAIN DESCRIPTION - LOCATION: LOCATION: SHOULDER

## 2024-09-26 NOTE — PROGRESS NOTES
"Subjective   Patient ID: Zahira Morris is a 69 y.o. female. They present today with a chief complaint of Left Shoulder pain  (Left shoulder pain radiating down arm ) and Neck Pain.    History of Present Illness  Patient is a very pleasant 69-year-old -American female, past medical history of hyperlipidemia, presenting to the clinic with complaint of shoulder pain.  Patient she woke up this morning with left shoulder pain made worse with movement of her left upper extremity and with movement of her neck.  States she does have history of similar pain in this area and received an MRI and she had a partial tear of \"something.\"  She denies any new trauma or injury.  Denies any numbness or tingling.  She did take some ibuprofen around 5:00 this morning that did provide some relief and allowed her to go back to bed.  She denies any chest pain or shortness of breath.  No palpitations lightheadedness or dizziness.  No diaphoresis.  No nausea vomiting or diarrhea.  No numbness, tingling, or focal weakness.      Neck Pain      Past Medical History  Allergies as of 09/26/2024 - Reviewed 09/26/2024   Allergen Reaction Noted    Egg Unknown 02/05/2023       (Not in a hospital admission)         Past Medical History:   Diagnosis Date    COVID-19     COVID-19 virus infection    Left lower quadrant abdominal tenderness 10/06/2020    LLQ abdominal tenderness    Lower abdominal pain, unspecified 07/16/2021    Lower abdominal pain    Other chest pain 10/01/2020    Atypical chest pain    Pain, unspecified 03/09/2020    Pain    Pelvic and perineal pain 02/03/2020    Female pelvic pain    Personal history of other benign neoplasm 10/12/2017    History of other benign neoplasm    Personal history of other infectious and parasitic diseases 10/01/2020    History of herpes zoster    Personal history of other specified conditions 10/01/2020    History of abdominal pain    Unspecified abdominal pain 03/04/2021    Right sided abdominal " pain       Past Surgical History:   Procedure Laterality Date    BREAST BIOPSY Bilateral     benign    CT GUIDED PERCUTANEOUS PERITONEAL OR RETROPERITONEAL FLUID COLLECTION DRAINAGE  01/18/2021    CT GUIDED PERCUTANEOUS PERITONEAL OR RETROPERITONEAL FLUID COLLECTION DRAINAGE 1/18/2021 [facility]    OTHER SURGICAL HISTORY  06/04/2021    Cataract surgery    OTHER SURGICAL HISTORY  03/02/2021    Salpingectomy    OTHER SURGICAL HISTORY  03/02/2021    Small bowel resection    OTHER SURGICAL HISTORY  03/02/2021    Hysteroscopy    OTHER SURGICAL HISTORY  10/06/2020    Hernia repair        reports that she quit smoking about 23 years ago. Her smoking use included cigarettes. She started smoking about 43 years ago. She has a 10 pack-year smoking history. She has never used smokeless tobacco. She reports that she does not drink alcohol and does not use drugs.    Review of Systems  Review of Systems   Musculoskeletal:  Positive for neck pain.        All review of systems negative unless stated in HPI.                           Objective    Vitals:    09/26/24 1249   BP: 138/79   Pulse: 69   Temp: 36.4 °C (97.5 °F)   SpO2: 96%   Weight: 96.6 kg (213 lb)     No LMP recorded. Patient is postmenopausal.    Physical Exam  General: Vitals Noted. No distress. Normocephalic.     HEENT: TMs normal, EOMI, normal conjunctiva, patent nares, Normal OP    Neck: Supple with no adenopathy.     Cardiac: Regular Rate and Rhythm. No murmur.     Pulmonary: Equal breath sounds bilaterally. No wheezes, rhonchi, or rales.    Abdomen: Soft, non-tender, with normal bowel sounds.     Musculoskeletal: There is tenderness to palpation with palpable hypertonicity of the left superior trapezius and along the left medial scapular border.  Is neurovascular intact distally with cap if less than 2 seconds and full  strength.  She does have slightly decreased range of motion of her neck with the left lateral flexion.  Otherwise moves all  extremities, no effusion, no edema.     Skin: No obvious rashes.  Procedures    Point of Care Test & Imaging Results from this visit    No results found.    Diagnostic study results (if any) were reviewed by Malachi Shipman PA-C.    Assessment/Plan   Allergies, medications, history, and pertinent labs/EKGs/Imaging reviewed by Malachi Shipman PA-C.     Medical Decision Making  Patient was seen eval in the clinic with complaint of left shoulder pain.  On exam patient is nontoxic well-appearing resting comfortably no acute distress.  Vital signs are stable, afebrile.  Chest is clear, heart is regular, belly soft and nontender.  Evaluation of left shoulder as above consistent with a spasm of the left trapezius muscle.  She is provided with 30 mg Toradol IM in the clinic reviewed discharged home in stable condition with instructions for supportive care including ice, rest, gentle stretching, ibuprofen and Tylenol for 6 hours needed for pain.  I did also provide a short course of a muscle relaxer in the form of baclofen with instructions to use mainly at night and only when she is at home and not driving.  Advised to follow close with the primary care physician to assess if repeat MRI is indicated.  I reviewed my impression, plan, strict return precautions with the patient.  She expresses understanding and agreement plan of care.    Orders and Diagnoses  Diagnoses and all orders for this visit:  Spasm of left trapezius muscle  -     ketorolac (Toradol) injection 30 mg  -     baclofen (Lioresal) 10 mg tablet; Take 1 tablet (10 mg) by mouth 3 times a day for 5 days.        Medical Admin Record      Follow Up Instructions  No follow-ups on file.    Patient disposition: Home    Electronically signed by Malachi Shipman PA-C  12:58 PM

## 2024-09-30 ENCOUNTER — HOSPITAL ENCOUNTER (EMERGENCY)
Facility: HOSPITAL | Age: 69
Discharge: HOME | End: 2024-09-30
Attending: EMERGENCY MEDICINE
Payer: COMMERCIAL

## 2024-09-30 ENCOUNTER — APPOINTMENT (OUTPATIENT)
Dept: CARDIOLOGY | Facility: HOSPITAL | Age: 69
End: 2024-09-30
Payer: COMMERCIAL

## 2024-09-30 ENCOUNTER — APPOINTMENT (OUTPATIENT)
Dept: RADIOLOGY | Facility: HOSPITAL | Age: 69
End: 2024-09-30
Payer: COMMERCIAL

## 2024-09-30 VITALS
DIASTOLIC BLOOD PRESSURE: 99 MMHG | SYSTOLIC BLOOD PRESSURE: 181 MMHG | BODY MASS INDEX: 34.07 KG/M2 | HEART RATE: 76 BPM | WEIGHT: 212 LBS | OXYGEN SATURATION: 98 % | HEIGHT: 66 IN | TEMPERATURE: 98.1 F | RESPIRATION RATE: 18 BRPM

## 2024-09-30 DIAGNOSIS — M79.602 PAIN OF LEFT UPPER EXTREMITY: Primary | ICD-10-CM

## 2024-09-30 LAB
ALBUMIN SERPL BCP-MCNC: 4.3 G/DL (ref 3.4–5)
ALP SERPL-CCNC: 95 U/L (ref 33–136)
ALT SERPL W P-5'-P-CCNC: 11 U/L (ref 7–45)
ANION GAP SERPL CALC-SCNC: 10 MMOL/L (ref 10–20)
AST SERPL W P-5'-P-CCNC: 21 U/L (ref 9–39)
BASOPHILS # BLD AUTO: 0.07 X10*3/UL (ref 0–0.1)
BASOPHILS NFR BLD AUTO: 1 %
BILIRUB SERPL-MCNC: 0.4 MG/DL (ref 0–1.2)
BUN SERPL-MCNC: 11 MG/DL (ref 6–23)
CALCIUM SERPL-MCNC: 9.3 MG/DL (ref 8.6–10.3)
CARDIAC TROPONIN I PNL SERPL HS: 6 NG/L (ref 0–13)
CHLORIDE SERPL-SCNC: 101 MMOL/L (ref 98–107)
CO2 SERPL-SCNC: 30 MMOL/L (ref 21–32)
CREAT SERPL-MCNC: 1.08 MG/DL (ref 0.5–1.05)
EGFRCR SERPLBLD CKD-EPI 2021: 56 ML/MIN/1.73M*2
EOSINOPHIL # BLD AUTO: 0.37 X10*3/UL (ref 0–0.7)
EOSINOPHIL NFR BLD AUTO: 5.4 %
ERYTHROCYTE [DISTWIDTH] IN BLOOD BY AUTOMATED COUNT: 13.8 % (ref 11.5–14.5)
GLUCOSE SERPL-MCNC: 89 MG/DL (ref 74–99)
HCT VFR BLD AUTO: 46.3 % (ref 36–46)
HGB BLD-MCNC: 14.3 G/DL (ref 12–16)
IMM GRANULOCYTES # BLD AUTO: 0.01 X10*3/UL (ref 0–0.7)
IMM GRANULOCYTES NFR BLD AUTO: 0.1 % (ref 0–0.9)
LYMPHOCYTES # BLD AUTO: 2.76 X10*3/UL (ref 1.2–4.8)
LYMPHOCYTES NFR BLD AUTO: 40.1 %
MCH RBC QN AUTO: 25.9 PG (ref 26–34)
MCHC RBC AUTO-ENTMCNC: 30.9 G/DL (ref 32–36)
MCV RBC AUTO: 84 FL (ref 80–100)
MONOCYTES # BLD AUTO: 0.7 X10*3/UL (ref 0.1–1)
MONOCYTES NFR BLD AUTO: 10.2 %
NEUTROPHILS # BLD AUTO: 2.98 X10*3/UL (ref 1.2–7.7)
NEUTROPHILS NFR BLD AUTO: 43.2 %
NRBC BLD-RTO: 0 /100 WBCS (ref 0–0)
PLATELET # BLD AUTO: 339 X10*3/UL (ref 150–450)
POTASSIUM SERPL-SCNC: 3.8 MMOL/L (ref 3.5–5.3)
PROT SERPL-MCNC: 7.4 G/DL (ref 6.4–8.2)
RBC # BLD AUTO: 5.53 X10*6/UL (ref 4–5.2)
SODIUM SERPL-SCNC: 137 MMOL/L (ref 136–145)
WBC # BLD AUTO: 6.9 X10*3/UL (ref 4.4–11.3)

## 2024-09-30 PROCEDURE — 84075 ASSAY ALKALINE PHOSPHATASE: CPT | Performed by: PHYSICIAN ASSISTANT

## 2024-09-30 PROCEDURE — 85025 COMPLETE CBC W/AUTO DIFF WBC: CPT | Performed by: PHYSICIAN ASSISTANT

## 2024-09-30 PROCEDURE — 2500000001 HC RX 250 WO HCPCS SELF ADMINISTERED DRUGS (ALT 637 FOR MEDICARE OP): Performed by: PHYSICIAN ASSISTANT

## 2024-09-30 PROCEDURE — 2500000004 HC RX 250 GENERAL PHARMACY W/ HCPCS (ALT 636 FOR OP/ED): Performed by: PHYSICIAN ASSISTANT

## 2024-09-30 PROCEDURE — 99283 EMERGENCY DEPT VISIT LOW MDM: CPT

## 2024-09-30 PROCEDURE — 73030 X-RAY EXAM OF SHOULDER: CPT | Mod: LT

## 2024-09-30 PROCEDURE — 96372 THER/PROPH/DIAG INJ SC/IM: CPT | Performed by: PHYSICIAN ASSISTANT

## 2024-09-30 PROCEDURE — 93005 ELECTROCARDIOGRAM TRACING: CPT

## 2024-09-30 PROCEDURE — 2500000001 HC RX 250 WO HCPCS SELF ADMINISTERED DRUGS (ALT 637 FOR MEDICARE OP): Performed by: EMERGENCY MEDICINE

## 2024-09-30 PROCEDURE — 73030 X-RAY EXAM OF SHOULDER: CPT | Mod: LEFT SIDE | Performed by: RADIOLOGY

## 2024-09-30 PROCEDURE — 84484 ASSAY OF TROPONIN QUANT: CPT | Performed by: PHYSICIAN ASSISTANT

## 2024-09-30 PROCEDURE — 2500000005 HC RX 250 GENERAL PHARMACY W/O HCPCS: Performed by: EMERGENCY MEDICINE

## 2024-09-30 PROCEDURE — 36415 COLL VENOUS BLD VENIPUNCTURE: CPT | Performed by: PHYSICIAN ASSISTANT

## 2024-09-30 RX ORDER — METHOCARBAMOL 500 MG/1
500 TABLET, FILM COATED ORAL 3 TIMES DAILY
Qty: 21 TABLET | Refills: 0 | Status: SHIPPED | OUTPATIENT
Start: 2024-09-30 | End: 2024-10-07

## 2024-09-30 RX ORDER — TRAMADOL HYDROCHLORIDE 50 MG/1
50 TABLET ORAL ONCE
Status: COMPLETED | OUTPATIENT
Start: 2024-09-30 | End: 2024-09-30

## 2024-09-30 RX ORDER — TRAMADOL HYDROCHLORIDE 50 MG/1
50 TABLET ORAL EVERY 6 HOURS PRN
Qty: 12 TABLET | Refills: 0 | Status: SHIPPED | OUTPATIENT
Start: 2024-09-30 | End: 2024-10-03

## 2024-09-30 RX ORDER — ACETAMINOPHEN 325 MG/1
975 TABLET ORAL ONCE
Status: COMPLETED | OUTPATIENT
Start: 2024-09-30 | End: 2024-09-30

## 2024-09-30 RX ORDER — METHOCARBAMOL 500 MG/1
500 TABLET, FILM COATED ORAL ONCE
Status: COMPLETED | OUTPATIENT
Start: 2024-09-30 | End: 2024-09-30

## 2024-09-30 RX ORDER — KETOROLAC TROMETHAMINE 30 MG/ML
15 INJECTION, SOLUTION INTRAMUSCULAR; INTRAVENOUS ONCE
Status: COMPLETED | OUTPATIENT
Start: 2024-09-30 | End: 2024-09-30

## 2024-09-30 RX ORDER — LIDOCAINE 560 MG/1
1 PATCH PERCUTANEOUS; TOPICAL; TRANSDERMAL DAILY
Status: DISCONTINUED | OUTPATIENT
Start: 2024-09-30 | End: 2024-09-30 | Stop reason: HOSPADM

## 2024-09-30 ASSESSMENT — PAIN DESCRIPTION - ORIENTATION: ORIENTATION: LEFT

## 2024-09-30 ASSESSMENT — PAIN DESCRIPTION - LOCATION: LOCATION: ARM

## 2024-09-30 ASSESSMENT — COLUMBIA-SUICIDE SEVERITY RATING SCALE - C-SSRS
1. IN THE PAST MONTH, HAVE YOU WISHED YOU WERE DEAD OR WISHED YOU COULD GO TO SLEEP AND NOT WAKE UP?: NO
6. HAVE YOU EVER DONE ANYTHING, STARTED TO DO ANYTHING, OR PREPARED TO DO ANYTHING TO END YOUR LIFE?: NO
2. HAVE YOU ACTUALLY HAD ANY THOUGHTS OF KILLING YOURSELF?: NO

## 2024-09-30 ASSESSMENT — PAIN SCALES - GENERAL: PAINLEVEL_OUTOF10: 10 - WORST POSSIBLE PAIN

## 2024-09-30 ASSESSMENT — PAIN - FUNCTIONAL ASSESSMENT: PAIN_FUNCTIONAL_ASSESSMENT: 0-10

## 2024-09-30 ASSESSMENT — PAIN DESCRIPTION - FREQUENCY: FREQUENCY: CONSTANT/CONTINUOUS

## 2024-09-30 ASSESSMENT — PAIN DESCRIPTION - DESCRIPTORS: DESCRIPTORS: ACHING

## 2024-09-30 ASSESSMENT — PAIN DESCRIPTION - PAIN TYPE: TYPE: ACUTE PAIN

## 2024-09-30 NOTE — DISCHARGE INSTRUCTIONS
methocarbamol muscle relaxant as prescribed  Tramadol as needed for severe pain  Lidocaine patch as prescribed  Follow-up with your primary care physician and/or orthopedics referral  Return to emergency room if needed.

## 2024-09-30 NOTE — Clinical Note
Zahira Morris was seen and treated in our emergency department on 9/30/2024.  She may return to work on 10/01/2024.  No strenuous activity or lifting with left arm for 1 week.     If you have any questions or concerns, please don't hesitate to call.      Carlos Herrera MD

## 2024-09-30 NOTE — ED PROVIDER NOTES
HPI   Chief Complaint   Patient presents with    Arm Pain       69-year-old female presents with left shoulder left arm pain for 5 days.  No trauma or injury.  Seen in urgent care last week treated with NSAIDs muscle relaxants without improvement.  Pain is aggravated movement.  No functional deficit.  No hemiparesis paresthesias.  Denies neck pain.  Denies headache.  No fever.  No cardiorespiratory symptoms-no chest pain shortness of breath nausea vomiting diaphoresis.  No history of heart disease diabetes.      History provided by:  Patient   used: No            Patient History   Past Medical History:   Diagnosis Date    COVID-19     COVID-19 virus infection    Left lower quadrant abdominal tenderness 10/06/2020    LLQ abdominal tenderness    Lower abdominal pain, unspecified 07/16/2021    Lower abdominal pain    Other chest pain 10/01/2020    Atypical chest pain    Pain, unspecified 03/09/2020    Pain    Pelvic and perineal pain 02/03/2020    Female pelvic pain    Personal history of other benign neoplasm 10/12/2017    History of other benign neoplasm    Personal history of other infectious and parasitic diseases 10/01/2020    History of herpes zoster    Personal history of other specified conditions 10/01/2020    History of abdominal pain    Unspecified abdominal pain 03/04/2021    Right sided abdominal pain     Past Surgical History:   Procedure Laterality Date    BREAST BIOPSY Bilateral     benign    CT GUIDED PERCUTANEOUS PERITONEAL OR RETROPERITONEAL FLUID COLLECTION DRAINAGE  01/18/2021    CT GUIDED PERCUTANEOUS PERITONEAL OR RETROPERITONEAL FLUID COLLECTION DRAINAGE 1/18/2021 [facility]    OTHER SURGICAL HISTORY  06/04/2021    Cataract surgery    OTHER SURGICAL HISTORY  03/02/2021    Salpingectomy    OTHER SURGICAL HISTORY  03/02/2021    Small bowel resection    OTHER SURGICAL HISTORY  03/02/2021    Hysteroscopy    OTHER SURGICAL HISTORY  10/06/2020    Hernia repair     Family  History   Problem Relation Name Age of Onset    Hypertension Mother          Benign    Other (Mesothelioma) Father       Social History     Tobacco Use    Smoking status: Former     Current packs/day: 0.00     Average packs/day: 0.5 packs/day for 20.0 years (10.0 ttl pk-yrs)     Types: Cigarettes     Start date:      Quit date:      Years since quittin.7    Smokeless tobacco: Never   Vaping Use    Vaping status: Never Used   Substance Use Topics    Alcohol use: Never    Drug use: Never       Physical Exam   ED Triage Vitals [24 1119]   Temperature Heart Rate Respirations BP   36.7 °C (98.1 °F) 76 18 (!) 181/99      Pulse Ox Temp Source Heart Rate Source Patient Position   98 % Temporal Monitor --      BP Location FiO2 (%)     -- --       Physical Exam  Vitals and nursing note reviewed.   Constitutional:       General: She is not in acute distress.     Appearance: Normal appearance. She is not ill-appearing, toxic-appearing or diaphoretic.   HENT:      Head: Normocephalic and atraumatic.   Eyes:      Extraocular Movements: Extraocular movements intact.      Conjunctiva/sclera: Conjunctivae normal.      Pupils: Pupils are equal, round, and reactive to light.   Neck:      Comments: No radicular pain elicited.  Cardiovascular:      Rate and Rhythm: Normal rate and regular rhythm.      Pulses: Normal pulses.   Pulmonary:      Effort: Pulmonary effort is normal.   Abdominal:      Palpations: Abdomen is soft.      Tenderness: There is no abdominal tenderness.   Musculoskeletal:         General: Tenderness present. No swelling, deformity or signs of injury.      Cervical back: Normal range of motion and neck supple. No rigidity or tenderness.      Comments: Tender left trapezius muscle area across the top of shoulder and involving the upper arm.  No deformity swelling.  No clinical dislocation.  Normal neurovascular exam.  No motor or sensory deficits.  +2 radial pulse distally.   Skin:     General: Skin  is warm and dry.      Capillary Refill: Capillary refill takes less than 2 seconds.      Findings: No bruising, erythema or rash.   Neurological:      General: No focal deficit present.      Mental Status: She is alert and oriented to person, place, and time.      Cranial Nerves: No cranial nerve deficit.      Sensory: No sensory deficit.      Motor: No weakness.   Psychiatric:         Mood and Affect: Mood normal.         Behavior: Behavior normal.         Thought Content: Thought content normal.         Judgment: Judgment normal.           ED Course & MDM   ED Course as of 09/30/24 1546   Mon Sep 30, 2024   1358 Troponin 6  Mildly elevated creatinine 1.08  CBC reviewed unremarkable. [GA]   1359 Shoulder x-ray degenerative changes.  No fracture or dislocation. [GA]      ED Course User Index  [GA] Petros Salgado PA-C         Diagnoses as of 09/30/24 1546   Pain of left upper extremity                 No data recorded     Beltran Coma Scale Score: 15 (09/30/24 1119 : Andrews Masterson RN)                       Labs Reviewed   CBC WITH AUTO DIFFERENTIAL - Abnormal       Result Value    WBC 6.9      nRBC 0.0      RBC 5.53 (*)     Hemoglobin 14.3      Hematocrit 46.3 (*)     MCV 84      MCH 25.9 (*)     MCHC 30.9 (*)     RDW 13.8      Platelets 339      Neutrophils % 43.2      Immature Granulocytes %, Automated 0.1      Lymphocytes % 40.1      Monocytes % 10.2      Eosinophils % 5.4      Basophils % 1.0      Neutrophils Absolute 2.98      Immature Granulocytes Absolute, Automated 0.01      Lymphocytes Absolute 2.76      Monocytes Absolute 0.70      Eosinophils Absolute 0.37      Basophils Absolute 0.07     COMPREHENSIVE METABOLIC PANEL - Abnormal    Glucose 89      Sodium 137      Potassium 3.8      Chloride 101      Bicarbonate 30      Anion Gap 10      Urea Nitrogen 11      Creatinine 1.08 (*)     eGFR 56 (*)     Calcium 9.3      Albumin 4.3      Alkaline Phosphatase 95      Total Protein 7.4      AST 21       Bilirubin, Total 0.4      ALT 11     TROPONIN I, HIGH SENSITIVITY - Normal    Troponin I, High Sensitivity 6      Narrative:     Less than 99th percentile of normal range cutoff-  Female and children under 18 years old <14 ng/L; Male <21 ng/L: Negative  Repeat testing should be performed if clinically indicated.     Female and children under 18 years old 14-50 ng/L; Male 21-50 ng/L:  Consistent with possible cardiac damage and possible increased clinical   risk. Serial measurements may help to assess extent of myocardial damage.     >50 ng/L: Consistent with cardiac damage, increased clinical risk and  myocardial infarction. Serial measurements may help assess extent of   myocardial damage.      NOTE: Children less than 1 year old may have higher baseline troponin   levels and results should be interpreted in conjunction with the overall   clinical context.     NOTE: Troponin I testing is performed using a different   testing methodology at [facility] than at other   [facility]. Direct result comparisons should only   be made within the same method.      XR shoulder left 2+ views   Final Result   Mild DJD as described. No acute interval change.             MACRO:   None        Signed by: Fernie Duval 9/30/2024 1:00 PM   Dictation workstation:   [id number]           Medical Decision Making  Left upper extremity pain reproducible with movement and palpation.  Likely musculoskeletal pain.  Recently seen in urgent care treated with NSAIDs muscle relaxants without relief.  Consider cervical radiculopathy.  Low suspicion for ACS.  Will check EKG labs.    Left shoulder x-ray.  DJD no fracture.  EKG no STEMI.  Labs troponin 6.  Toradol IM Tylenol p.o. tramadol p.o.  Robaxin lidocaine patch.    Evaluation of left arm pain consistent with musculoskeletal pain without fracture or dislocation.  Normal neurovascular exam.  No motor or sensory deficits.  Cardiac workup unremarkable with normal troponin after  5 days of symptoms.  Consider cervical radiculopathy however patient has no neck pain and no motor or sensory deficits.  May need further outpatient evaluation.  Stable for discharge outpatient management.  Continue baclofen.  Prescribed tramadol 12 tablets.  Lidocaine patches.  No further NSAIDs with mildly elevated creatinine.    The patient has also been seen and evaluated by the ER physician.  Discharge for outpatient management treatment.  Agrees with ER assessment, disposition and plan.        Amount and/or Complexity of Data Reviewed  Labs: ordered. Decision-making details documented in ED Course.     Details: As above  Radiology: ordered. Decision-making details documented in ED Course.     Details: As above  ECG/medicine tests: ordered. Decision-making details documented in ED Course.     Details: No STEMI per ER physician  Sinus bradycardia rate 59.  Nonspecific T wave abnormality.  Normal intervals.  No ectopy.  QTc 427    Risk  Prescription drug management.        Procedure  Procedures     Petros Salgado PA-C  09/30/24 4679

## 2024-10-02 ENCOUNTER — OFFICE VISIT (OUTPATIENT)
Dept: ORTHOPEDIC SURGERY | Facility: HOSPITAL | Age: 69
End: 2024-10-02
Payer: COMMERCIAL

## 2024-10-02 VITALS — BODY MASS INDEX: 34.07 KG/M2 | HEIGHT: 66 IN | WEIGHT: 212 LBS

## 2024-10-02 DIAGNOSIS — M54.12 CERVICAL RADICULITIS: ICD-10-CM

## 2024-10-02 LAB
ATRIAL RATE: 59 BPM
P AXIS: 46 DEGREES
P OFFSET: 202 MS
P ONSET: 150 MS
PR INTERVAL: 146 MS
Q ONSET: 223 MS
QRS COUNT: 10 BEATS
QRS DURATION: 88 MS
QT INTERVAL: 432 MS
QTC CALCULATION(BAZETT): 427 MS
QTC FREDERICIA: 429 MS
R AXIS: -3 DEGREES
T AXIS: 31 DEGREES
T OFFSET: 439 MS
VENTRICULAR RATE: 59 BPM

## 2024-10-02 PROCEDURE — 1157F ADVNC CARE PLAN IN RCRD: CPT | Performed by: ORTHOPAEDIC SURGERY

## 2024-10-02 PROCEDURE — 1123F ACP DISCUSS/DSCN MKR DOCD: CPT | Performed by: ORTHOPAEDIC SURGERY

## 2024-10-02 PROCEDURE — 99213 OFFICE O/P EST LOW 20 MIN: CPT | Performed by: ORTHOPAEDIC SURGERY

## 2024-10-02 PROCEDURE — 1159F MED LIST DOCD IN RCRD: CPT | Performed by: ORTHOPAEDIC SURGERY

## 2024-10-02 PROCEDURE — 3008F BODY MASS INDEX DOCD: CPT | Performed by: ORTHOPAEDIC SURGERY

## 2024-10-02 RX ORDER — METHYLPREDNISOLONE 4 MG/1
TABLET ORAL
Qty: 21 TABLET | Refills: 0 | Status: SHIPPED | OUTPATIENT
Start: 2024-10-02 | End: 2024-10-09

## 2024-10-02 NOTE — PROGRESS NOTES
Rotation of her left shoulder she has had just several days of left upper extremity pain numbness and tingling into her left thumb.  It is exacerbated by motion of her neck or motion of her arm.  No specific history of injury.  She has been seen in the past for left shoulder pain.    The patient is pleasant and cooperative.  The patient is alert and oriented ×3.  Auditory function is intact.  The patient is a good historian.  The patient is not in acute distress.  Eye exam significant for nonicteric sclera, intact ocular muscle movement.  Breathing is rhythmic symmetric and nonlabored.  Patient is in some distress from discomfort she has discomfort on extension of her neck which reproduces her symptoms into her left arm.  She has no pain and active elevation of her left arm above 90 degrees motor power is inhibited by guarding grade 4 in each direction abduction internal/external rotation and  strength.  Light touch sensation over left upper extremity is grossly intact.    Radiographs of the shoulder.  Normal fracture dislocation subluxation or arthritic degenerative changes subacromial glenohumeral spaces well-preserved.    Cervical radiculitis    In addition to the tramadol and muscle relaxants provided by the emergency room I recommended steroid taper.  Prescription was provided for Medrol Dosepak today.  I also recommended follow-up with 1 my colleagues specializing in spine problems.    This was dictated using voice recognition software and not corrected for grammatical or spelling errors.

## 2024-10-07 ENCOUNTER — TRANSCRIBE ORDERS (OUTPATIENT)
Dept: ORTHOPEDIC SURGERY | Facility: HOSPITAL | Age: 69
End: 2024-10-07
Payer: COMMERCIAL

## 2024-10-07 DIAGNOSIS — M54.12 CERVICAL RADICULITIS: ICD-10-CM

## 2024-10-08 ENCOUNTER — OFFICE VISIT (OUTPATIENT)
Dept: ORTHOPEDIC SURGERY | Facility: CLINIC | Age: 69
End: 2024-10-08
Payer: COMMERCIAL

## 2024-10-08 ENCOUNTER — HOSPITAL ENCOUNTER (OUTPATIENT)
Dept: RADIOLOGY | Facility: CLINIC | Age: 69
Discharge: HOME | End: 2024-10-08
Payer: COMMERCIAL

## 2024-10-08 DIAGNOSIS — M54.12 CERVICAL RADICULITIS: ICD-10-CM

## 2024-10-08 DIAGNOSIS — M47.26 OSTEOARTHRITIS OF SPINE WITH RADICULOPATHY, LUMBAR REGION: Primary | ICD-10-CM

## 2024-10-08 PROCEDURE — 72050 X-RAY EXAM NECK SPINE 4/5VWS: CPT

## 2024-10-08 PROCEDURE — 99213 OFFICE O/P EST LOW 20 MIN: CPT | Performed by: PHYSICIAN ASSISTANT

## 2024-10-08 PROCEDURE — 1159F MED LIST DOCD IN RCRD: CPT | Performed by: PHYSICIAN ASSISTANT

## 2024-10-08 PROCEDURE — 1157F ADVNC CARE PLAN IN RCRD: CPT | Performed by: PHYSICIAN ASSISTANT

## 2024-10-08 PROCEDURE — 1125F AMNT PAIN NOTED PAIN PRSNT: CPT | Performed by: PHYSICIAN ASSISTANT

## 2024-10-08 PROCEDURE — 72050 X-RAY EXAM NECK SPINE 4/5VWS: CPT | Performed by: RADIOLOGY

## 2024-10-08 PROCEDURE — 1123F ACP DISCUSS/DSCN MKR DOCD: CPT | Performed by: PHYSICIAN ASSISTANT

## 2024-10-08 RX ORDER — GABAPENTIN 300 MG/1
300 CAPSULE ORAL 3 TIMES DAILY
Qty: 90 CAPSULE | Refills: 2 | Status: SHIPPED | OUTPATIENT
Start: 2024-10-08 | End: 2025-01-06

## 2024-10-08 ASSESSMENT — PAIN - FUNCTIONAL ASSESSMENT: PAIN_FUNCTIONAL_ASSESSMENT: 0-10

## 2024-10-08 ASSESSMENT — PAIN SCALES - GENERAL: PAINLEVEL_OUTOF10: 7

## 2024-10-08 ASSESSMENT — PAIN DESCRIPTION - DESCRIPTORS: DESCRIPTORS: ACHING;THROBBING;SHARP

## 2024-10-15 NOTE — PROGRESS NOTES
HPI:  Zahira Morris is a 69 y.o. female who presents to the spine clinic for acute LUE radiculopathy, referred by Dr. Sparks.     Symptoms started 09/24/24 without trauma or inciting event. Patient reports waking up with pain into the left deltoid and numbness into the thumb. Admits to neck and intrascapular pain. No symptoms in the RUE. No issues with walking/balance. No upper or lower extremity weakness.     Initially went to urgent care on 09/26 and subsequently the ED on 09/30. Had follow up appointment with Dr. Sparks on 10/02/24 who referred her here for further management.     In addition to OTC meds she has tried toradol injection, Baclofen, lidocaine patches, robaxin, tramadol, and oral steroids with minimal relief of pain.     No PT or spinal injections.     Radiographs performed of the right shoulder and reviewed by Dr. Sparks without acute findings.     ROS:  Reviewed on EMR and patient intake sheet.    PMH/SH:  Reviewed on EMR and patient intake sheet.    Exam:  Physical Exam  Spine Musculoskeletal Exam    Well appearing, NAD  Pleasant & cooperative  BMI 34.22  normal ROM cervical spine with rotation, flexion/extension  + paraspinal muscle spasms  upper extremity sensation intact  upper extremity motor 5/5 throughout  normal gait & station  Hyporeflexic; neg hoffmans bl    Radiology:     Xrays cervical spine done in the office today 10/08/24 personally reviewed. Straightening of the normal cervical lordosis. Mild/moderate disc degeneration and spondylotic changes C4-5, C5-6. No fractures or abnormal motion with flex/ext.      Assessment and Plan:  1. Cervical radiculitis  - Referral to Physical Therapy; Future    2. Osteoarthritis of spine with radiculopathy, lumbar region  - gabapentin (Neurontin) 300 mg capsule; Take 1 capsule (300 mg) by mouth 3 times a day.  Dispense: 90 capsule; Refill: 2    I reviewed the imaging studies with Zahira Morris in detail today. Patient was seen today  for discussion and evaluation of radicular symptoms that have not improved on their own. We discussed conservative management and the patient was referred to PT today.    I recommended she finish the steroid taper provided by Dr Sparks followed by OTC Tylenol/NSAIDs for pain relief after finishing the steroid taper. New prescription for Gabapentin provided today.    Patient was recommended to follow up in 4-6 weeks if their symptoms do not improve, or sooner if symptoms suddenly worsen and they can not complete PT. If symptoms are not improved by next visit, we will discuss MRI imaging to further investigate source of the patient’s pain and discuss next steps.    Patient in agreement to plan of care. Of course Zahira Morris is welcome to call at anytime with questions or concerns.     Angle Banerjee PA-C  Department of Orthopaedic Surgery    [address] [place] [address]    Voicemail: [phone]   Appts: [phone]  Fax: [phone]

## 2024-10-17 ENCOUNTER — OFFICE VISIT (OUTPATIENT)
Dept: ORTHOPEDIC SURGERY | Facility: HOSPITAL | Age: 69
End: 2024-10-17
Payer: COMMERCIAL

## 2024-10-17 VITALS — HEIGHT: 66 IN | BODY MASS INDEX: 34.22 KG/M2

## 2024-10-17 DIAGNOSIS — M25.512 CHRONIC LEFT SHOULDER PAIN: ICD-10-CM

## 2024-10-17 DIAGNOSIS — M54.12 CERVICAL RADICULITIS: ICD-10-CM

## 2024-10-17 DIAGNOSIS — M75.82 ROTATOR CUFF TENDONITIS, LEFT: Primary | ICD-10-CM

## 2024-10-17 DIAGNOSIS — G89.29 CHRONIC LEFT SHOULDER PAIN: ICD-10-CM

## 2024-10-17 PROCEDURE — 99213 OFFICE O/P EST LOW 20 MIN: CPT | Performed by: ORTHOPAEDIC SURGERY

## 2024-10-17 PROCEDURE — 20610 DRAIN/INJ JOINT/BURSA W/O US: CPT | Mod: LT | Performed by: ORTHOPAEDIC SURGERY

## 2024-10-17 ASSESSMENT — PAIN DESCRIPTION - DESCRIPTORS: DESCRIPTORS: THROBBING;STABBING

## 2024-10-17 ASSESSMENT — PAIN - FUNCTIONAL ASSESSMENT: PAIN_FUNCTIONAL_ASSESSMENT: 0-10

## 2024-10-17 ASSESSMENT — PAIN SCALES - GENERAL: PAINLEVEL_OUTOF10: 8

## 2024-10-19 RX ORDER — TRIAMCINOLONE ACETONIDE 40 MG/ML
40 INJECTION, SUSPENSION INTRA-ARTICULAR; INTRAMUSCULAR
Status: COMPLETED | OUTPATIENT
Start: 2024-10-17 | End: 2024-10-17

## 2024-10-19 RX ORDER — LIDOCAINE HYDROCHLORIDE 10 MG/ML
2 INJECTION, SOLUTION INFILTRATION; PERINEURAL
Status: COMPLETED | OUTPATIENT
Start: 2024-10-17 | End: 2024-10-17

## 2024-10-19 NOTE — PROGRESS NOTES
69-year-old is seen with left shoulder pain.  She has been having persistent severe sharp shooting pain in the left shoulder that is worse with overhead reaching and lifting activities.  She also has pain that radiates from her neck down to her arm with a severe sharp shooting pain.    Pleasant and no acute distress.  Left shoulder forward flexion 160°. No effusion or instability. There is positive Neer and García impingement. There is subacromial crepitus and tenderness around the subacromial space. No biceps tenderness. No acromioclavicular tenderness. Rotator cuff strength is intact but there is discomfort with strength testing. Right shoulder forward flexion 180°. No effusion or instability. No impingement or crepitus or tenderness involving the subacromial space. No biceps or acromioclavicular tenderness. There is intact rotator cuff strength. There is decreased range of motion of the cervical spine with mild pain. Both upper extremities are well perfused skin is intact and muscle tone is adequate. Elbow flexion and extension and wrist flexion and extension strength are intact.    Multiple x-ray views of the left shoulder are personally reviewed and there is no acute bony abnormality.    Multiple x-ray views of the cervical spine are personally reviewed and there is degenerative changes with disc space narrowing particularly at C5-6.    A discussion about shoulder pain and cervical radiculopathy was done.  The overlap in symptoms was discussed.  She will perform physical therapy for her shoulder and neck.  She has evaluation for her cervical spine scheduled.  In regard to the shoulder treatment options were reviewed and the decision was made to proceed with a cortisone injection.  If she has pain that localizes to the shoulder then further imaging with MRI of the shoulder could be done.    L Inj/Asp: L subacromial bursa on 10/17/2024 1:00 PM  Indications: pain  Details: 22 G needle, posterior  approach  Medications: 40 mg triamcinolone acetonide 40 mg/mL; 2 mL lidocaine 10 mg/mL (1 %)  Procedure, treatment alternatives, risks and benefits explained, specific risks discussed. Consent was given by the patient.

## 2024-10-22 ENCOUNTER — APPOINTMENT (OUTPATIENT)
Dept: RADIOLOGY | Facility: HOSPITAL | Age: 69
End: 2024-10-22
Payer: COMMERCIAL

## 2024-10-22 ENCOUNTER — HOSPITAL ENCOUNTER (EMERGENCY)
Facility: HOSPITAL | Age: 69
Discharge: HOME | End: 2024-10-22
Payer: COMMERCIAL

## 2024-10-22 VITALS
TEMPERATURE: 98.2 F | RESPIRATION RATE: 18 BRPM | HEART RATE: 48 BPM | WEIGHT: 212 LBS | HEIGHT: 66 IN | DIASTOLIC BLOOD PRESSURE: 73 MMHG | SYSTOLIC BLOOD PRESSURE: 137 MMHG | OXYGEN SATURATION: 100 % | BODY MASS INDEX: 34.07 KG/M2

## 2024-10-22 DIAGNOSIS — S80.11XA CONTUSION OF RIGHT LOWER EXTREMITY, INITIAL ENCOUNTER: Primary | ICD-10-CM

## 2024-10-22 PROCEDURE — 73610 X-RAY EXAM OF ANKLE: CPT | Mod: RT

## 2024-10-22 PROCEDURE — 73590 X-RAY EXAM OF LOWER LEG: CPT | Mod: RIGHT SIDE | Performed by: RADIOLOGY

## 2024-10-22 PROCEDURE — 73590 X-RAY EXAM OF LOWER LEG: CPT | Mod: RT

## 2024-10-22 PROCEDURE — 73610 X-RAY EXAM OF ANKLE: CPT | Mod: RIGHT SIDE | Performed by: RADIOLOGY

## 2024-10-22 PROCEDURE — 2500000001 HC RX 250 WO HCPCS SELF ADMINISTERED DRUGS (ALT 637 FOR MEDICARE OP): Performed by: PHYSICIAN ASSISTANT

## 2024-10-22 PROCEDURE — 99284 EMERGENCY DEPT VISIT MOD MDM: CPT | Performed by: PHYSICIAN ASSISTANT

## 2024-10-22 RX ORDER — TRAMADOL HYDROCHLORIDE 50 MG/1
50 TABLET ORAL EVERY 8 HOURS PRN
Status: DISCONTINUED | OUTPATIENT
Start: 2024-10-22 | End: 2024-10-22 | Stop reason: HOSPADM

## 2024-10-22 RX ORDER — TRAMADOL HYDROCHLORIDE 50 MG/1
50 TABLET ORAL EVERY 6 HOURS
Qty: 12 TABLET | Refills: 0 | Status: SHIPPED | OUTPATIENT
Start: 2024-10-22 | End: 2024-10-25

## 2024-10-22 ASSESSMENT — PAIN - FUNCTIONAL ASSESSMENT
PAIN_FUNCTIONAL_ASSESSMENT: 0-10

## 2024-10-22 ASSESSMENT — PAIN DESCRIPTION - PROGRESSION
CLINICAL_PROGRESSION: GRADUALLY IMPROVING
CLINICAL_PROGRESSION: NOT CHANGED

## 2024-10-22 ASSESSMENT — PAIN SCALES - GENERAL
PAINLEVEL_OUTOF10: 8
PAINLEVEL_OUTOF10: 9
PAINLEVEL_OUTOF10: 4

## 2024-10-22 ASSESSMENT — PAIN DESCRIPTION - DESCRIPTORS
DESCRIPTORS: ACHING;TENDER
DESCRIPTORS: ACHING;SHARP;TENDER

## 2024-10-22 ASSESSMENT — ACTIVITIES OF DAILY LIVING (ADL): EFFECT OF PAIN ON DAILY ACTIVITIES: DECREASED

## 2024-10-22 ASSESSMENT — COLUMBIA-SUICIDE SEVERITY RATING SCALE - C-SSRS
2. HAVE YOU ACTUALLY HAD ANY THOUGHTS OF KILLING YOURSELF?: NO
6. HAVE YOU EVER DONE ANYTHING, STARTED TO DO ANYTHING, OR PREPARED TO DO ANYTHING TO END YOUR LIFE?: NO
1. IN THE PAST MONTH, HAVE YOU WISHED YOU WERE DEAD OR WISHED YOU COULD GO TO SLEEP AND NOT WAKE UP?: NO

## 2024-10-22 ASSESSMENT — PAIN DESCRIPTION - PAIN TYPE
TYPE: ACUTE PAIN
TYPE: ACUTE PAIN

## 2024-10-22 ASSESSMENT — PAIN DESCRIPTION - LOCATION
LOCATION: LEG
LOCATION: LEG

## 2024-10-22 ASSESSMENT — PAIN DESCRIPTION - DIRECTION: RADIATING_TOWARDS: DOWN LEG

## 2024-10-22 ASSESSMENT — PAIN DESCRIPTION - ORIENTATION: ORIENTATION: RIGHT;LOWER

## 2024-10-22 ASSESSMENT — PAIN DESCRIPTION - FREQUENCY: FREQUENCY: CONSTANT/CONTINUOUS

## 2024-10-22 ASSESSMENT — PAIN DESCRIPTION - ONSET: ONSET: SUDDEN

## 2024-10-22 NOTE — ED PROVIDER NOTES
HPI   Chief Complaint   Patient presents with    Leg Injury     Pt reports R lower leg was squished in car door this AM for approx 1 min or less. Pt's  had fell and closed the door on her. Pt c/o R lower leg pain w/ swelling and tenderness. Distal PMS intact.    Leg Pain    Extremity Swelling       History of present illness: 69-year-old female complains of right leg injury that occurred at 2 AM.  Patient states that she had her right leg out of the passenger door of the vehicle and her  tripped falling onto the door which crushed her right leg at the distal fibular region.  Says the pain is 9 out of 10 and worse with standing.  Denies any additional injuries.  Took some Aleve which offered limited improvement.  Denies paresthesias weakness or loss of function.  Has ability to ambulate although is uncomfortable.  Denies head injury, loss of consciousness, additional injuries.  There are no open wounds.    Review of systems: Constitutional, eye, ENT, cardiovascular, respiratory, gastrointestinal, genitourinary, neurologic, musculoskeletal, dermatologic, hematologic, endocrine systems were evaluated and were negative unless otherwise specified in history of present illness.    Medications: Reviewed and per nursing note.    Family history: Denies relevant medical conditions.    Social history: Denies tobacco, alcohol, drug use.      Physical exam:    Appearance: Well-developed, well-nourished, nontoxic-appearing, alert and oriented x3. Talking in complete sentences.    HEENT:  Head normocephalic atraumatic,    NECK:  Nml Inspection    Respiratory: Clear to auscultation    Cardiovascular: Regular rate and rhythm. Capillary refill less than 3 seconds on all extremities.    Neuro:  Oriented x 3, Speech Clear, cranial nerves grossly intact. Normal sensation to light touch in all 4 extremities. Deep tendon reflexes 2+ symmetrically in upper and lower extremities.    Musculoskeletal: Tenderness right distal  lateral malleolus and distal fibular region.  Normal anterior posterior ankle drawer.  Patient spontaneously moves all 4 extremities with 5/5 muscle strength.    Skin:  No cyanosis, clubbing, edema, open wounds, or rashes.            Patient History   Past Medical History:   Diagnosis Date    COVID-19     COVID-19 virus infection    Left lower quadrant abdominal tenderness 10/06/2020    LLQ abdominal tenderness    Lower abdominal pain, unspecified 07/16/2021    Lower abdominal pain    Other chest pain 10/01/2020    Atypical chest pain    Pain, unspecified 03/09/2020    Pain    Pelvic and perineal pain 02/03/2020    Female pelvic pain    Personal history of other benign neoplasm 10/12/2017    History of other benign neoplasm    Personal history of other infectious and parasitic diseases 10/01/2020    History of herpes zoster    Personal history of other specified conditions 10/01/2020    History of abdominal pain    Unspecified abdominal pain 03/04/2021    Right sided abdominal pain     Past Surgical History:   Procedure Laterality Date    BREAST BIOPSY Bilateral     benign    CT GUIDED PERCUTANEOUS PERITONEAL OR RETROPERITONEAL FLUID COLLECTION DRAINAGE  01/18/2021    CT GUIDED PERCUTANEOUS PERITONEAL OR RETROPERITONEAL FLUID COLLECTION DRAINAGE 1/18/2021 [facility]    OTHER SURGICAL HISTORY  06/04/2021    Cataract surgery    OTHER SURGICAL HISTORY  03/02/2021    Salpingectomy    OTHER SURGICAL HISTORY  03/02/2021    Small bowel resection    OTHER SURGICAL HISTORY  03/02/2021    Hysteroscopy    OTHER SURGICAL HISTORY  10/06/2020    Hernia repair     Family History   Problem Relation Name Age of Onset    Hypertension Mother          Benign    Other (Mesothelioma) Father       Social History     Tobacco Use    Smoking status: Former     Current packs/day: 0.00     Average packs/day: 0.5 packs/day for 20.0 years (10.0 ttl pk-yrs)     Types: Cigarettes     Start date: 1981     Quit date: 2001     Years since  quittin.8     Passive exposure: Past    Smokeless tobacco: Never   Vaping Use    Vaping status: Never Used   Substance Use Topics    Alcohol use: Never    Drug use: Never       Physical Exam   ED Triage Vitals [10/22/24 1202]   Temperature Heart Rate Respirations BP   36.8 °C (98.2 °F) (!) 48 18 137/73      Pulse Ox Temp Source Heart Rate Source Patient Position   100 % Temporal Monitor Lying      BP Location FiO2 (%)     Right arm --       Physical Exam      ED Course & MDM   ED Course as of 10/22/24 1437   Tue Oct 22, 2024   1404 XR ankle right 3+ views [SK]      ED Course User Index  [SK] Jarek Hunter PA-C         Diagnoses as of 10/22/24 1437   Contusion of right lower extremity, initial encounter                 No data recorded     Beltran Coma Scale Score: 15 (10/22/24 1204 : Pedro Sarkar RN)                           Medical Decision Making  XR tibia fibula right 2 views   Final Result    No acute findings.                MACRO:    None          Signed by: Alejandro Baker 10/22/2024 1:27 PM    Dictation workstation:   [id number]     XR ankle right 3+ views   Final Result    No acute findings                MACRO:    None          Signed by: Alejandro Baker 10/22/2024 1:29 PM    Dictation workstation:   [id number]         Patient presents with injury to right ankle and fibula.  Differential diagnosis of fracture, contusion, dislocation, sprain, strain, vascular compromise, compartment syndrome.  Examination shows tenderness of the right distal fibula and lateral malleolus of the ankle with normal neurovascular exam and compartments with full ROM.  No evidence of neurovascular injury or compartment syndrome.    X-ray of right tibia-fibula and right ankle ordered.  Given tramadol for pain control.    X-ray shows no fracture or dislocation.  Presentation consistent with contusion.  Given description for tramadol as needed for breakthrough pain and continue using NSAID as needed.    Patient will be  discharged to home with prescription.  Patient is educated in signs and symptoms of worsening symptoms and reasons to come back to the emergency department.  Will need to follow up with primary care provider.  Patient does not report social determinants of health impacting ability to obtain care that is needed.  Patient agrees with plan.    This is a transcription.  Text was reviewed for errors, but some transcription errors may remain.  Please call for any questions.          Procedure  Procedures     Jarek Hunter PA-C  10/22/24 9104

## 2024-10-22 NOTE — ED TRIAGE NOTES
Pt reports R lower leg was squished in car door this AM for approx 1 min or less. Pt's  had fell and closed the door on her. Pt c/o R lower leg pain w/ swelling and tenderness. Distal PMS intact.

## 2024-10-29 ENCOUNTER — APPOINTMENT (OUTPATIENT)
Dept: PRIMARY CARE | Facility: CLINIC | Age: 69
End: 2024-10-29
Payer: COMMERCIAL

## 2024-10-29 VITALS
OXYGEN SATURATION: 98 % | BODY MASS INDEX: 34.07 KG/M2 | WEIGHT: 212 LBS | HEART RATE: 60 BPM | DIASTOLIC BLOOD PRESSURE: 73 MMHG | SYSTOLIC BLOOD PRESSURE: 113 MMHG | HEIGHT: 66 IN

## 2024-10-29 DIAGNOSIS — E55.9 VITAMIN D DEFICIENCY: ICD-10-CM

## 2024-10-29 DIAGNOSIS — E66.811 CLASS 1 OBESITY DUE TO EXCESS CALORIES WITH SERIOUS COMORBIDITY AND BODY MASS INDEX (BMI) OF 34.0 TO 34.9 IN ADULT: ICD-10-CM

## 2024-10-29 DIAGNOSIS — M47.26 OSTEOARTHRITIS OF SPINE WITH RADICULOPATHY, LUMBAR REGION: ICD-10-CM

## 2024-10-29 DIAGNOSIS — R73.9 ELEVATED BLOOD SUGAR: ICD-10-CM

## 2024-10-29 DIAGNOSIS — R93.1 AGATSTON CAC SCORE 100-199: ICD-10-CM

## 2024-10-29 DIAGNOSIS — R94.6 ABNORMAL THYROID EXAM: ICD-10-CM

## 2024-10-29 DIAGNOSIS — E78.2 MIXED HYPERLIPIDEMIA: Primary | ICD-10-CM

## 2024-10-29 DIAGNOSIS — E66.09 CLASS 1 OBESITY DUE TO EXCESS CALORIES WITH SERIOUS COMORBIDITY AND BODY MASS INDEX (BMI) OF 34.0 TO 34.9 IN ADULT: ICD-10-CM

## 2024-10-29 DIAGNOSIS — F32.1 MODERATE SINGLE CURRENT EPISODE OF MAJOR DEPRESSIVE DISORDER (MULTI): ICD-10-CM

## 2024-10-29 PROCEDURE — 1157F ADVNC CARE PLAN IN RCRD: CPT | Performed by: FAMILY MEDICINE

## 2024-10-29 PROCEDURE — G2211 COMPLEX E/M VISIT ADD ON: HCPCS | Performed by: FAMILY MEDICINE

## 2024-10-29 PROCEDURE — 1123F ACP DISCUSS/DSCN MKR DOCD: CPT | Performed by: FAMILY MEDICINE

## 2024-10-29 PROCEDURE — 99214 OFFICE O/P EST MOD 30 MIN: CPT | Performed by: FAMILY MEDICINE

## 2024-10-29 PROCEDURE — 1036F TOBACCO NON-USER: CPT | Performed by: FAMILY MEDICINE

## 2024-10-29 PROCEDURE — 1160F RVW MEDS BY RX/DR IN RCRD: CPT | Performed by: FAMILY MEDICINE

## 2024-10-29 PROCEDURE — 1159F MED LIST DOCD IN RCRD: CPT | Performed by: FAMILY MEDICINE

## 2024-10-29 PROCEDURE — 3008F BODY MASS INDEX DOCD: CPT | Performed by: FAMILY MEDICINE

## 2024-10-29 RX ORDER — GABAPENTIN 300 MG/1
300 CAPSULE ORAL 3 TIMES DAILY
Qty: 90 CAPSULE | Refills: 2 | Status: SHIPPED | OUTPATIENT
Start: 2024-10-29 | End: 2025-01-27

## 2024-11-18 ENCOUNTER — APPOINTMENT (OUTPATIENT)
Dept: PHYSICAL THERAPY | Facility: CLINIC | Age: 69
End: 2024-11-18
Payer: COMMERCIAL

## 2024-11-19 ENCOUNTER — APPOINTMENT (OUTPATIENT)
Dept: PHYSICAL THERAPY | Facility: CLINIC | Age: 69
End: 2024-11-19
Payer: COMMERCIAL

## 2025-03-17 ENCOUNTER — OFFICE VISIT (OUTPATIENT)
Dept: ORTHOPEDIC SURGERY | Facility: HOSPITAL | Age: 70
End: 2025-03-17
Payer: COMMERCIAL

## 2025-03-17 DIAGNOSIS — M79.602 PAIN OF LEFT UPPER EXTREMITY: ICD-10-CM

## 2025-03-17 PROCEDURE — 1159F MED LIST DOCD IN RCRD: CPT | Performed by: ORTHOPAEDIC SURGERY

## 2025-03-17 PROCEDURE — 1157F ADVNC CARE PLAN IN RCRD: CPT | Performed by: ORTHOPAEDIC SURGERY

## 2025-03-17 PROCEDURE — 99214 OFFICE O/P EST MOD 30 MIN: CPT | Performed by: ORTHOPAEDIC SURGERY

## 2025-03-17 PROCEDURE — 1125F AMNT PAIN NOTED PAIN PRSNT: CPT | Performed by: ORTHOPAEDIC SURGERY

## 2025-03-17 PROCEDURE — 1123F ACP DISCUSS/DSCN MKR DOCD: CPT | Performed by: ORTHOPAEDIC SURGERY

## 2025-03-17 RX ORDER — METHYLPREDNISOLONE 4 MG/1
TABLET ORAL
Qty: 21 TABLET | Refills: 0 | Status: SHIPPED | OUTPATIENT
Start: 2025-03-17 | End: 2025-03-23

## 2025-03-17 ASSESSMENT — PAIN - FUNCTIONAL ASSESSMENT: PAIN_FUNCTIONAL_ASSESSMENT: 0-10

## 2025-03-17 ASSESSMENT — PAIN SCALES - GENERAL: PAINLEVEL_OUTOF10: 7

## 2025-03-17 NOTE — PROGRESS NOTES
Is here for evaluation of her left upper extremity she has no shoulder pain no problems with her shoulder at this time no neck pain either.  She does have pain on the radial side of her left forearm extending to her left thumb and involuntary shaking of the left upper extremity.  She has been seen by one of my colleagues specializing cervical spine problems she has taken anti-inflammatory medicine she has had extensive physical therapy and this radial arm pain and tremor and numbness in the left thumb has not resolved.    On exam today the patient is pleasant cooperative she is not in acute distress.  During the interview she would occasionally have a shaking in her left arm which she would have to stop by grabbing it with her right hand.  She has full cervical range of motion with no pain.  She has full range of motion of the left shoulder and elbow, and the with no pain.  She has full motor power deltoid, triceps, biceps,  strength, thumb extension, flexion, and adduction.  Light touch sensation in the left upper extremity is grossly intact.  Left upper extremity integument intact no lesions scars lacerations abrasions or contusions or skin color changes left radial pulse easily palpable.  Brisk capillary refill.    Left arm pain    Evaluate the patient's shoulder and I do not find any pathology with regard to the left shoulder or cervical spine.  She does not require any further intervention for her shoulder.  The patient does have an unusual constellation of symptoms which appear to be related to peripheral nerve function.  I have recommended further consultation and evaluation by neurology and I have also recommended the patient started on a steroid taper.    This was dictated using voice recognition software and not corrected for grammatical or spelling errors.

## 2025-04-07 NOTE — PROGRESS NOTES
"Subjective   Patient ID: Zahira Morris is a 69 y.o. female who presents for Arm Pain (Left) and Medicare Annual Wellness Visit Subsequent.    HPI     The patient reports that she is taking atorvastatin for hyperlipidemia and Ozempic for weight loss. She denies having side effects from them. Her depression and blood pressure are both stable without medications.     Denies any falls in the last year.  No depression over the last few weeks. She stays well hydrated, eat a well-balanced diet and exercises regularly.     She has concerns over left arm tremor mainly when she holds something. Also, she has arm pain which radiates upwards associated with numbness in her palm.  [facility] past and has done physical therapy for 2 months which was not beneficial.       She also experiences right lower quadrant pain which she describes like \"twisting.\" Pain bothers her while sleeping.  She mentions she had a perforation of her uterus and had removal of right fallopian tube and bowel resection in 2021.      Labs reviewed. Cholesterol is still high. Vitamin D was low. A1c was 6.0. She states she missed only 2 days of atorvastatin. She is losing weight. Encouraged to watch diet.     Review of Systems  Constitutional: No fever or chills, No Night Sweats  Eyes: No Blurry Vision or Eye sight problems  ENT: No Nasal Discharge, Hoarseness, sore throat  Cardiovascular: no chest pain, no palpitations and no syncope.   Respiratory: no cough, no shortness of breath during exertion and no shortness of breath at rest.   Gastrointestinal:  + right lower quadrant abdominal pain, no nausea and no vomiting.   : No vaginal discharge, burning with urination, no blood in urine or stools   Skin: No Skin rashes or Lesions  Neuro: No Headache, no dizziness or Numbness or tingling  Psych: No Anxiety, depression or sleeping problems  Heme: No Easy bleeding or brusing.   MSK:  + left arm pain and tremor " "    Objective   /78   Pulse (!) 49   Ht 1.676 m (5' 6\")   Wt 93 kg (205 lb)   LMP  (LMP Unknown)   SpO2 95%   BMI 33.09 kg/m²     Physical Exam  Constitutional: Alert and in no acute distress. Well developed, well nourished.   Head and Face: Head and face: Normal.    Eyes: Normal external exam. Pupils were equal in size, round, reactive to light (PERRL) with normal accommodation and extraocular movements intact (EOMI).   Ears, Nose, Mouth, and Throat: External inspection of ears and nose: Normal.  Hearing: Normal.  Nasal mucosa, septum, and turbinates: Normal.  Lips, teeth, and gums: Normal.  Oropharynx: Normal.   Neck: No neck mass was observed. Supple. Thyroid not enlarged and there were no palpable thyroid nodules.   Cardiovascular: Heart rate and rhythm were normal, normal S1 and S2. Pedal pulses: Normal. No peripheral edema.   Pulmonary: No respiratory distress. Clear bilateral breath sounds.   Abdomen: Soft nontender; no abdominal mass palpated. Normal bowel sounds. No organomegaly.   : Deferred   Musculoskeletal: No joint swelling seen, normal movements of all extremities. Range of motion: Normal.  Muscle strength/tone: Normal.    Skin: Normal skin color and pigmentation, normal skin turgor, and no rash.   Neurologic: Deep tendon reflexes were 2+ and symmetric.   Psychiatric: Judgment and insight: Intact. Mood and affect: Normal.  Lymphatic: No cervical lymphadenopathy. Palpation of lymph nodes in axillae: Normal.  Palpation of lymph nodes in groin: Normal.    Lab Results   Component Value Date    WBC 5.4 04/08/2025    HGB 14.4 04/08/2025    HCT 46.1 (H) 04/08/2025     04/08/2025    CHOL 225 (H) 04/08/2025    TRIG 123 04/08/2025    HDL 53 04/08/2025    ALT 12 04/08/2025    AST 16 04/08/2025     04/08/2025    K 4.5 04/08/2025     04/08/2025    CREATININE 0.93 04/08/2025    BUN 10 04/08/2025    CO2 29 04/08/2025    TSH 1.95 04/08/2025    INR 1.5 (H) 01/18/2021    HGBA1C 6.0 (H) " 04/08/2025       XR ankle right 3+ views  Narrative: Interpreted By:  Alejandro Baker,   STUDY:  XR ANKLE RIGHT 3+ VIEWS; ;  10/22/2024 12:51 pm      INDICATION:  Signs/Symptoms:injury, pain.          COMPARISON:  None.      ACCESSION NUMBER(S):  [id number]      ORDERING CLINICIAN:  GRANT HUBER      FINDINGS:  Exuberant exostosis of the medial navicular bone likely resulting in  tendinopathy. Tibiotalar joint appears normal. Achilles spur.      No joint effusion.      Impression: No acute findings          MACRO:  None      Signed by: Alejandro Baker 10/22/2024 1:29 PM  Dictation workstation:   [id number]  XR tibia fibula right 2 views  Narrative: Interpreted By:  Alejandro Baker,   STUDY:  XR TIBIA FIBULA RIGHT 2 VIEWS; ;  10/22/2024 12:51 pm      INDICATION:  Signs/Symptoms:injury, pain.          COMPARISON:  None.      ACCESSION NUMBER(S):  [id number]      ORDERING CLINICIAN:  GRANT HUBER      FINDINGS:  No acute fracture dislocation or bone lesion. Knee joint demonstrates  enthesopathy of the patella.      There is spurring/enthesopathy of the Achilles tendon insertion.      Impression: No acute findings.          MACRO:  None      Signed by: Alejandro Baker 10/22/2024 1:27 PM  Dictation workstation:   [id number]      Assessment/Plan   Diagnoses and all orders for this visit:  Medicare annual wellness visit, subsequent  -     1 [facility] Follow Up [facility] - PCP - Wellness Exam; Future  Mixed hyperlipidemia  -     atorvastatin (Lipitor) 40 mg tablet; Take 1 tablet (40 mg) by mouth once daily.  Sinus bradycardia  Class 1 obesity due to excess calories with serious comorbidity and body mass index (BMI) of 33.0 to 33.9 in adult  Encounter for screening mammogram for breast cancer  -     BI mammo bilateral screening tomosynthesis; Future  Cervical radiculopathy  -     MR cervical spine wo IV contrast; Future  Right lower quadrant abdominal pain  -     Referral to Colorectal Surgery;  Future        Dear Zahira Morris     It was my pleasure to take care of you today in the office. Below are the things we discussed today:    1. 1. Immunizations: Yearly Flu shot is recommended. The patient declined all immunizations.     2. Blood Work: Reviewed    3. Seen your dentist twice a year  4. Yearly Eye exam is recommended     5. BMI: Obese   6: Diet recommendations:   Eat Clean, Try to have as many home cooked meals as possible  Avoid processed foods which contain excess calories, sugar, and sodium.    7. Exercise recommendations:   150 minutes a week to maintain your weight     If you have to lose weight, you need a better diet and exercise plan.     8. Supplements recommended:  a - Calcium 600 mg up to twice a day to get a total of 1200 mg. Each 8 oz of milk or yogurt or 1 oz of cheese, 1 Banana, 1 serving of green Leafy vegetable has about 300 mg of Calcium, so you may subtract that amount. Calcium citrate is the only acceptable supplement to take if you take an acid suppressing medication like Prilosec; otherwise Calcium carbonate is acceptable too (It can cause Constipation).   b - Vitamin D - 2000 IU daily     9. Please get your Living will / Advance directive completed if you do not have one already. Please make sure our office has a copy of the latest one.     10. Colonoscopy: Uptodate. Last done in Oct 2020. Repeat in Oct 2030   11. Mammogram: Ordered for April 2025  12. PAP: Not indicated   13. DEXA: Done last year, normal   14: Skin Check: Please see Dermatology once a year for a Skin Check.      15. Hyperlipidemia:  Cholesterol is still elevated. Increased atorvastatin to 40 mg. Please take atorvastatin daily as prescribed and  will recheck her numbers in 2 months. Orders are in     16. Weight loss: The patient is on Ozempic.     17. Cervical radiculopathy: MR cervical spine ordered. Patient has completed PT with no relief.     18. Right lower quadrant abdominal pain:  Referral provided to  Colorectal Surgery.     Follow up in one year for a Physical. Please call the office before your Physical to see if you need blood work completed prior to your physical.     Please call me if any questions arise from now until your next visit. I will call you after I am done seeing patients. A Doctor is always available by phone when the office is closed. Please feel free to call for help with any problem that you feel shouldn't wait until the office re-opens.     I, Duyen Collins MD, attest that this note for 4/9/2025 accurately reflects documentation that my scribe Desiree Us, made at my direction in my capacity as Duyen Collins MD when I treated Zahira Morris.    Scribe Attestation  By signing my name below, Desiree GARCÍA Scribe   attest that this documentation has been prepared under the direction and in the presence of Duyen Collins MD.

## 2025-04-09 ENCOUNTER — APPOINTMENT (OUTPATIENT)
Dept: PRIMARY CARE | Facility: CLINIC | Age: 70
End: 2025-04-09
Payer: COMMERCIAL

## 2025-04-09 VITALS
DIASTOLIC BLOOD PRESSURE: 78 MMHG | SYSTOLIC BLOOD PRESSURE: 129 MMHG | HEIGHT: 66 IN | WEIGHT: 205 LBS | OXYGEN SATURATION: 95 % | HEART RATE: 49 BPM | BODY MASS INDEX: 32.95 KG/M2

## 2025-04-09 DIAGNOSIS — Z00.00 MEDICARE ANNUAL WELLNESS VISIT, SUBSEQUENT: Primary | ICD-10-CM

## 2025-04-09 DIAGNOSIS — M54.12 CERVICAL RADICULOPATHY: ICD-10-CM

## 2025-04-09 DIAGNOSIS — R00.1 SINUS BRADYCARDIA: ICD-10-CM

## 2025-04-09 DIAGNOSIS — E66.09 CLASS 1 OBESITY DUE TO EXCESS CALORIES WITH SERIOUS COMORBIDITY AND BODY MASS INDEX (BMI) OF 33.0 TO 33.9 IN ADULT: ICD-10-CM

## 2025-04-09 DIAGNOSIS — E78.2 MIXED HYPERLIPIDEMIA: ICD-10-CM

## 2025-04-09 DIAGNOSIS — Z12.31 ENCOUNTER FOR SCREENING MAMMOGRAM FOR BREAST CANCER: ICD-10-CM

## 2025-04-09 DIAGNOSIS — E66.811 CLASS 1 OBESITY DUE TO EXCESS CALORIES WITH SERIOUS COMORBIDITY AND BODY MASS INDEX (BMI) OF 33.0 TO 33.9 IN ADULT: ICD-10-CM

## 2025-04-09 DIAGNOSIS — R10.31 RIGHT LOWER QUADRANT ABDOMINAL PAIN: ICD-10-CM

## 2025-04-09 PROBLEM — H53.9 CHANGES IN VISION: Status: RESOLVED | Noted: 2023-02-05 | Resolved: 2025-04-09

## 2025-04-09 PROBLEM — M79.674 PAIN AROUND TOENAIL, RIGHT FOOT: Status: RESOLVED | Noted: 2023-02-05 | Resolved: 2025-04-09

## 2025-04-09 PROBLEM — N93.9 ABNORMAL UTERINE BLEEDING (AUB): Status: RESOLVED | Noted: 2023-02-05 | Resolved: 2025-04-09

## 2025-04-09 PROBLEM — E55.9 VITAMIN D DEFICIENCY: Status: RESOLVED | Noted: 2023-02-05 | Resolved: 2025-04-09

## 2025-04-09 PROBLEM — F32.1 MODERATE SINGLE CURRENT EPISODE OF MAJOR DEPRESSIVE DISORDER (MULTI): Status: RESOLVED | Noted: 2023-02-05 | Resolved: 2025-04-09

## 2025-04-09 PROBLEM — R39.89 SENSATION OF PRESSURE IN BLADDER AREA: Status: RESOLVED | Noted: 2023-02-05 | Resolved: 2025-04-09

## 2025-04-09 LAB
25(OH)D3+25(OH)D2 SERPL-MCNC: 14 NG/ML (ref 30–100)
ALBUMIN SERPL-MCNC: 4.1 G/DL (ref 3.6–5.1)
ALP SERPL-CCNC: 84 U/L (ref 37–153)
ALT SERPL-CCNC: 12 U/L (ref 6–29)
ANION GAP SERPL CALCULATED.4IONS-SCNC: 6 MMOL/L (CALC) (ref 7–17)
AST SERPL-CCNC: 16 U/L (ref 10–35)
BILIRUB SERPL-MCNC: 0.4 MG/DL (ref 0.2–1.2)
BUN SERPL-MCNC: 10 MG/DL (ref 7–25)
CALCIUM SERPL-MCNC: 9.5 MG/DL (ref 8.6–10.4)
CHLORIDE SERPL-SCNC: 104 MMOL/L (ref 98–110)
CHOLEST SERPL-MCNC: 225 MG/DL
CHOLEST/HDLC SERPL: 4.2 (CALC)
CO2 SERPL-SCNC: 29 MMOL/L (ref 20–32)
CREAT SERPL-MCNC: 0.93 MG/DL (ref 0.5–1.05)
EGFRCR SERPLBLD CKD-EPI 2021: 67 ML/MIN/1.73M2
ERYTHROCYTE [DISTWIDTH] IN BLOOD BY AUTOMATED COUNT: 13.9 % (ref 11–15)
EST. AVERAGE GLUCOSE BLD GHB EST-MCNC: 126 MG/DL
EST. AVERAGE GLUCOSE BLD GHB EST-SCNC: 7 MMOL/L
GLUCOSE SERPL-MCNC: 91 MG/DL (ref 65–99)
HBA1C MFR BLD: 6 % OF TOTAL HGB
HCT VFR BLD AUTO: 46.1 % (ref 35–45)
HDLC SERPL-MCNC: 53 MG/DL
HGB BLD-MCNC: 14.4 G/DL (ref 11.7–15.5)
LDLC SERPL CALC-MCNC: 147 MG/DL (CALC)
MCH RBC QN AUTO: 26.7 PG (ref 27–33)
MCHC RBC AUTO-ENTMCNC: 31.2 G/DL (ref 32–36)
MCV RBC AUTO: 85.5 FL (ref 80–100)
NONHDLC SERPL-MCNC: 172 MG/DL (CALC)
PLATELET # BLD AUTO: 333 THOUSAND/UL (ref 140–400)
PMV BLD REES-ECKER: 11 FL (ref 7.5–12.5)
POTASSIUM SERPL-SCNC: 4.5 MMOL/L (ref 3.5–5.3)
PROT SERPL-MCNC: 6.8 G/DL (ref 6.1–8.1)
RBC # BLD AUTO: 5.39 MILLION/UL (ref 3.8–5.1)
SODIUM SERPL-SCNC: 139 MMOL/L (ref 135–146)
TRIGL SERPL-MCNC: 123 MG/DL
TSH SERPL-ACNC: 1.95 MIU/L (ref 0.4–4.5)
WBC # BLD AUTO: 5.4 THOUSAND/UL (ref 3.8–10.8)

## 2025-04-09 PROCEDURE — G2211 COMPLEX E/M VISIT ADD ON: HCPCS | Performed by: FAMILY MEDICINE

## 2025-04-09 PROCEDURE — 3008F BODY MASS INDEX DOCD: CPT | Performed by: FAMILY MEDICINE

## 2025-04-09 PROCEDURE — 1157F ADVNC CARE PLAN IN RCRD: CPT | Performed by: FAMILY MEDICINE

## 2025-04-09 PROCEDURE — 1170F FXNL STATUS ASSESSED: CPT | Performed by: FAMILY MEDICINE

## 2025-04-09 PROCEDURE — 1159F MED LIST DOCD IN RCRD: CPT | Performed by: FAMILY MEDICINE

## 2025-04-09 PROCEDURE — 99214 OFFICE O/P EST MOD 30 MIN: CPT | Performed by: FAMILY MEDICINE

## 2025-04-09 PROCEDURE — 99397 PER PM REEVAL EST PAT 65+ YR: CPT | Performed by: FAMILY MEDICINE

## 2025-04-09 PROCEDURE — G0439 PPPS, SUBSEQ VISIT: HCPCS | Performed by: FAMILY MEDICINE

## 2025-04-09 PROCEDURE — 1123F ACP DISCUSS/DSCN MKR DOCD: CPT | Performed by: FAMILY MEDICINE

## 2025-04-09 PROCEDURE — 1160F RVW MEDS BY RX/DR IN RCRD: CPT | Performed by: FAMILY MEDICINE

## 2025-04-09 PROCEDURE — 1036F TOBACCO NON-USER: CPT | Performed by: FAMILY MEDICINE

## 2025-04-09 RX ORDER — ATORVASTATIN CALCIUM 40 MG/1
40 TABLET, FILM COATED ORAL DAILY
Qty: 90 TABLET | Refills: 3 | Status: SHIPPED | OUTPATIENT
Start: 2025-04-09

## 2025-04-09 ASSESSMENT — ACTIVITIES OF DAILY LIVING (ADL)
GROCERY_SHOPPING: INDEPENDENT
DRESSING: INDEPENDENT
MANAGING_FINANCES: INDEPENDENT
DOING_HOUSEWORK: INDEPENDENT
TAKING_MEDICATION: INDEPENDENT
BATHING: INDEPENDENT

## 2025-04-09 ASSESSMENT — PATIENT HEALTH QUESTIONNAIRE - PHQ9
SUM OF ALL RESPONSES TO PHQ9 QUESTIONS 1 AND 2: 0
1. LITTLE INTEREST OR PLEASURE IN DOING THINGS: NOT AT ALL
2. FEELING DOWN, DEPRESSED OR HOPELESS: NOT AT ALL

## 2025-04-09 ASSESSMENT — COLUMBIA-SUICIDE SEVERITY RATING SCALE - C-SSRS
1. IN THE PAST MONTH, HAVE YOU WISHED YOU WERE DEAD OR WISHED YOU COULD GO TO SLEEP AND NOT WAKE UP?: NO
2. HAVE YOU ACTUALLY HAD ANY THOUGHTS OF KILLING YOURSELF?: NO

## 2025-04-22 ENCOUNTER — HOSPITAL ENCOUNTER (OUTPATIENT)
Dept: RADIOLOGY | Facility: HOSPITAL | Age: 70
Discharge: HOME | End: 2025-04-22
Payer: COMMERCIAL

## 2025-04-22 DIAGNOSIS — M54.12 CERVICAL RADICULOPATHY: ICD-10-CM

## 2025-04-22 PROCEDURE — 72141 MRI NECK SPINE W/O DYE: CPT | Performed by: RADIOLOGY

## 2025-04-22 PROCEDURE — 72141 MRI NECK SPINE W/O DYE: CPT

## 2025-04-29 ENCOUNTER — APPOINTMENT (OUTPATIENT)
Dept: PRIMARY CARE | Facility: CLINIC | Age: 70
End: 2025-04-29
Payer: COMMERCIAL

## 2025-05-01 ENCOUNTER — HOSPITAL ENCOUNTER (OUTPATIENT)
Dept: RADIOLOGY | Facility: CLINIC | Age: 70
Discharge: HOME | End: 2025-05-01
Payer: COMMERCIAL

## 2025-05-01 VITALS — WEIGHT: 205.03 LBS | HEIGHT: 66 IN | BODY MASS INDEX: 32.95 KG/M2

## 2025-05-01 DIAGNOSIS — Z12.31 ENCOUNTER FOR SCREENING MAMMOGRAM FOR BREAST CANCER: ICD-10-CM

## 2025-05-01 PROCEDURE — 77067 SCR MAMMO BI INCL CAD: CPT | Performed by: RADIOLOGY

## 2025-05-01 PROCEDURE — 77067 SCR MAMMO BI INCL CAD: CPT

## 2025-05-01 PROCEDURE — 77063 BREAST TOMOSYNTHESIS BI: CPT | Performed by: RADIOLOGY

## 2025-06-11 ENCOUNTER — APPOINTMENT (OUTPATIENT)
Dept: SURGERY | Facility: CLINIC | Age: 70
End: 2025-06-11
Payer: COMMERCIAL

## 2025-06-11 VITALS
DIASTOLIC BLOOD PRESSURE: 73 MMHG | HEART RATE: 69 BPM | SYSTOLIC BLOOD PRESSURE: 117 MMHG | OXYGEN SATURATION: 98 % | HEIGHT: 66 IN | WEIGHT: 202 LBS | BODY MASS INDEX: 32.47 KG/M2

## 2025-06-11 DIAGNOSIS — R10.9 ABDOMINAL PAIN, UNSPECIFIED ABDOMINAL LOCATION: ICD-10-CM

## 2025-06-11 DIAGNOSIS — R10.31 RIGHT LOWER QUADRANT ABDOMINAL PAIN: ICD-10-CM

## 2025-06-11 PROCEDURE — 3008F BODY MASS INDEX DOCD: CPT | Performed by: COLON & RECTAL SURGERY

## 2025-06-11 PROCEDURE — 1126F AMNT PAIN NOTED NONE PRSNT: CPT | Performed by: COLON & RECTAL SURGERY

## 2025-06-11 PROCEDURE — 1159F MED LIST DOCD IN RCRD: CPT | Performed by: COLON & RECTAL SURGERY

## 2025-06-11 PROCEDURE — 99214 OFFICE O/P EST MOD 30 MIN: CPT | Performed by: COLON & RECTAL SURGERY

## 2025-06-11 ASSESSMENT — PAIN SCALES - GENERAL: PAINLEVEL_OUTOF10: 0-NO PAIN

## 2025-06-11 NOTE — PROGRESS NOTES
HISTORY OF PRESENTING ILLNESS: Zahira Morris is a 69yoF with history of endometrial hyperplasia s/p hysteroscopy with polypectomy c/b perforation and bowel injury 1/12/21. CT showed multiple fluid collections and IR drain placed with output of stool. She underwent diagnostic laparoscopy, exploratory laparotomy, drainage of intraabdominal abscess, extensive lysis of adhesions, small bowel resection, and flexible sigmoidoscopy 1/19/21, with Dr. Aracelis Green.      She last saw Dr. Green 3/2021 and returns today with RLQ pain.      This pain has been ongoing for couple months per the patient.  She said that it is sharp stabbing pain that typically will wake her up at night.  She is unable to describe any inciting factors.  She also acknowledges that nothing alleviates the pain apart from utilizing Tylenol occasionally.  She denies any changes to her bowel habits.  No bleeding per rectum no bleeding per vagina.  She denies any nausea or vomiting.  No other abdominal pain.    Medical History[1]      Surgical History[2]      Social History[3]      Family History[4]      Current Medications[5]       RX Allergies[6]      REVIEW OF SYSTEMS: As stated in the HPI above. All other systems negative.    Review of Systems    Labs:       Imaging:  Last colonoscopy 2020    Cardiology, Vascular, and Other Imaging  No other imaging results found for the past 7 days       Physical Exam:  Physical Exam  Constitutional:       Appearance: Normal appearance.   HENT:      Head: Normocephalic and atraumatic.   Cardiovascular:      Rate and Rhythm: Normal rate and regular rhythm.   Pulmonary:      Effort: Pulmonary effort is normal.   Abdominal:      General: Abdomen is flat.      Palpations: Abdomen is soft.      Tenderness: There is no guarding or rebound.   Musculoskeletal:      Comments: Leg lifts were not able to reproduce the abdominal pain.  Abduction and adduction at the hip also did not reproduce the pain.  Sitting up  also did not reproduce pain.   Neurological:      General: No focal deficit present.      Mental Status: She is alert and oriented to person, place, and time.   Psychiatric:         Mood and Affect: Mood normal.         Behavior: Behavior normal.     ASSESSMENT AND PLAN: 69-year-old female with a complex surgical history now presents with paroxysmal right lower quadrant sharp stabbing pains.  I have reviewed her prior scans and labs.  I do not think this is related to her bowel notices seem to be musculoskeletal however I will order a CAT scan of the abdomen and pelvis to evaluate this further and rule out any GI/GYN pathology that can explain her current set of symptoms.    MD Felisa Mendoza, RN   6/10/2025          [1]   Past Medical History:  Diagnosis Date    Endometrial hyperplasia     Left lower quadrant abdominal tenderness 10/06/2020    LLQ abdominal tenderness    Other chest pain 10/01/2020    Atypical chest pain    Personal history of other benign neoplasm 10/12/2017    History of other benign neoplasm    Personal history of other infectious and parasitic diseases 10/01/2020    History of herpes zoster   [2]   Past Surgical History:  Procedure Laterality Date    BREAST BIOPSY Bilateral     benign    CT GUIDED PERCUTANEOUS PERITONEAL OR RETROPERITONEAL FLUID COLLECTION DRAINAGE  01/18/2021    CT GUIDED PERCUTANEOUS PERITONEAL OR RETROPERITONEAL FLUID COLLECTION DRAINAGE 1/18/2021 [facility]    OTHER SURGICAL HISTORY  06/04/2021    Cataract surgery    OTHER SURGICAL HISTORY  03/02/2021    Salpingectomy    OTHER SURGICAL HISTORY  01/19/2021    diagnostic laparoscopy, exploratory laparotomy, drainage of intraabdominal abscess, extensive lysis of adhesions, small bowel resection, and flexible sigmoidoscopy    OTHER SURGICAL HISTORY  01/12/2021    hysteroscopy with polypectomy c/b perforation and bowel injury    OTHER SURGICAL HISTORY  10/06/2020    Hernia repair   [3]   Social  History  Tobacco Use    Smoking status: Former     Current packs/day: 0.00     Average packs/day: 0.5 packs/day for 20.0 years (10.0 ttl pk-yrs)     Types: Cigarettes     Start date:      Quit date:      Years since quittin.4     Passive exposure: Past    Smokeless tobacco: Never   Vaping Use    Vaping status: Never Used   Substance Use Topics    Alcohol use: Never    Drug use: Never   [4]   Family History  Problem Relation Name Age of Onset    Hypertension Mother          Benign    Other (Mesothelioma) Father     [5]   Current Outpatient Medications:     atorvastatin (Lipitor) 40 mg tablet, Take 1 tablet (40 mg) by mouth once daily., Disp: 90 tablet, Rfl: 3    gabapentin (Neurontin) 300 mg capsule, Take 1 capsule (300 mg) by mouth 3 times a day. (Patient not taking: Reported on 2025), Disp: 90 capsule, Rfl: 2    semaglutide (Ozempic) 1 mg/dose (2 mg/1.5 mL) pen injector, Inject 1 mg under the skin 1 (one) time per week., Disp: , Rfl:   [6]   Allergies  Allergen Reactions    Egg Unknown

## 2025-06-13 ENCOUNTER — TELEPHONE (OUTPATIENT)
Dept: SURGERY | Facility: CLINIC | Age: 70
End: 2025-06-13
Payer: COMMERCIAL

## 2025-06-13 NOTE — TELEPHONE ENCOUNTER
I sent Zuly Holcomb message 6/11 and saw today it was unread so I called to let her know that Dr. Pond would like to obtain CT AP, CBC and CMP.  I provided her with instructions for both as well as Radiology scheduling number.  She verbalized understanding.

## 2025-06-21 LAB
ALBUMIN SERPL-MCNC: 4.1 G/DL (ref 3.6–5.1)
ALP SERPL-CCNC: 91 U/L (ref 37–153)
ALT SERPL-CCNC: 9 U/L (ref 6–29)
ANION GAP SERPL CALCULATED.4IONS-SCNC: 6 MMOL/L (CALC) (ref 7–17)
AST SERPL-CCNC: 16 U/L (ref 10–35)
BILIRUB SERPL-MCNC: 0.3 MG/DL (ref 0.2–1.2)
BUN SERPL-MCNC: 12 MG/DL (ref 7–25)
CALCIUM SERPL-MCNC: 9.2 MG/DL (ref 8.6–10.4)
CHLORIDE SERPL-SCNC: 103 MMOL/L (ref 98–110)
CO2 SERPL-SCNC: 29 MMOL/L (ref 20–32)
CREAT SERPL-MCNC: 1.07 MG/DL (ref 0.5–1.05)
EGFRCR SERPLBLD CKD-EPI 2021: 57 ML/MIN/1.73M2
ERYTHROCYTE [DISTWIDTH] IN BLOOD BY AUTOMATED COUNT: 13.1 % (ref 11–15)
GLUCOSE SERPL-MCNC: 80 MG/DL (ref 65–99)
HCT VFR BLD AUTO: 44.6 % (ref 35–45)
HGB BLD-MCNC: 13.5 G/DL (ref 11.7–15.5)
MCH RBC QN AUTO: 26.5 PG (ref 27–33)
MCHC RBC AUTO-ENTMCNC: 30.3 G/DL (ref 32–36)
MCV RBC AUTO: 87.5 FL (ref 80–100)
PLATELET # BLD AUTO: 326 THOUSAND/UL (ref 140–400)
PMV BLD REES-ECKER: 11.2 FL (ref 7.5–12.5)
POTASSIUM SERPL-SCNC: 4.6 MMOL/L (ref 3.5–5.3)
PROT SERPL-MCNC: 6.7 G/DL (ref 6.1–8.1)
RBC # BLD AUTO: 5.1 MILLION/UL (ref 3.8–5.1)
SODIUM SERPL-SCNC: 138 MMOL/L (ref 135–146)
WBC # BLD AUTO: 5.8 THOUSAND/UL (ref 3.8–10.8)

## 2025-06-26 ENCOUNTER — APPOINTMENT (OUTPATIENT)
Dept: RADIOLOGY | Facility: HOSPITAL | Age: 70
End: 2025-06-26
Payer: COMMERCIAL

## 2025-06-26 DIAGNOSIS — R10.9 ABDOMINAL PAIN, UNSPECIFIED ABDOMINAL LOCATION: ICD-10-CM

## 2025-06-26 PROCEDURE — 2550000001 HC RX 255 CONTRASTS: Performed by: COLON & RECTAL SURGERY

## 2025-06-26 PROCEDURE — 74176 CT ABD & PELVIS W/O CONTRAST: CPT | Performed by: RADIOLOGY

## 2025-06-26 PROCEDURE — 74176 CT ABD & PELVIS W/O CONTRAST: CPT

## 2025-06-26 RX ORDER — DIATRIZOATE MEGLUMINE AND DIATRIZOATE SODIUM 660; 100 MG/ML; MG/ML
60 SOLUTION ORAL; RECTAL ONCE
Status: COMPLETED | OUTPATIENT
Start: 2025-06-26 | End: 2025-06-26

## 2025-06-26 RX ADMIN — DIATRIZOATE MEGLUMINE AND DIATRIZOATE SODIUM 30 ML: 660; 100 LIQUID ORAL; RECTAL at 10:28
